# Patient Record
Sex: FEMALE | Race: WHITE | NOT HISPANIC OR LATINO | Employment: OTHER | URBAN - METROPOLITAN AREA
[De-identification: names, ages, dates, MRNs, and addresses within clinical notes are randomized per-mention and may not be internally consistent; named-entity substitution may affect disease eponyms.]

---

## 2023-11-06 ENCOUNTER — APPOINTMENT (OUTPATIENT)
Dept: GENERAL RADIOLOGY | Facility: CLINIC | Age: 88
End: 2023-11-06
Attending: STUDENT IN AN ORGANIZED HEALTH CARE EDUCATION/TRAINING PROGRAM
Payer: MEDICARE

## 2023-11-06 ENCOUNTER — HOSPITAL ENCOUNTER (OUTPATIENT)
Facility: CLINIC | Age: 88
Setting detail: OBSERVATION
Discharge: HOME | End: 2023-11-07
Attending: STUDENT IN AN ORGANIZED HEALTH CARE EDUCATION/TRAINING PROGRAM | Admitting: STUDENT IN AN ORGANIZED HEALTH CARE EDUCATION/TRAINING PROGRAM
Payer: MEDICARE

## 2023-11-06 DIAGNOSIS — R06.2 WHEEZING: ICD-10-CM

## 2023-11-06 DIAGNOSIS — R06.02 SHORTNESS OF BREATH: ICD-10-CM

## 2023-11-06 DIAGNOSIS — J44.1 COPD WITH ACUTE EXACERBATION (H): Primary | ICD-10-CM

## 2023-11-06 PROBLEM — K61.1 PERI-RECTAL ABSCESS: Status: ACTIVE | Noted: 2021-09-22

## 2023-11-06 PROBLEM — J38.5 LARYNGOSPASM: Status: ACTIVE | Noted: 2019-06-12

## 2023-11-06 LAB
ANION GAP SERPL CALCULATED.3IONS-SCNC: 9 MMOL/L (ref 7–15)
ATRIAL RATE - MUSE: 87 BPM
BASOPHILS # BLD AUTO: 0.1 10E3/UL (ref 0–0.2)
BASOPHILS NFR BLD AUTO: 1 %
BUN SERPL-MCNC: 10.9 MG/DL (ref 8–23)
CALCIUM SERPL-MCNC: 7.5 MG/DL (ref 8.8–10.2)
CHLORIDE SERPL-SCNC: 109 MMOL/L (ref 98–107)
CREAT SERPL-MCNC: 0.48 MG/DL (ref 0.51–0.95)
DEPRECATED HCO3 PLAS-SCNC: 23 MMOL/L (ref 22–29)
DIASTOLIC BLOOD PRESSURE - MUSE: NORMAL MMHG
EGFRCR SERPLBLD CKD-EPI 2021: >90 ML/MIN/1.73M2
EOSINOPHIL # BLD AUTO: 0.5 10E3/UL (ref 0–0.7)
EOSINOPHIL NFR BLD AUTO: 5 %
ERYTHROCYTE [DISTWIDTH] IN BLOOD BY AUTOMATED COUNT: 13.6 % (ref 10–15)
FLUAV RNA SPEC QL NAA+PROBE: NEGATIVE
FLUBV RNA RESP QL NAA+PROBE: NEGATIVE
GLUCOSE SERPL-MCNC: 104 MG/DL (ref 70–99)
HCT VFR BLD AUTO: 36.2 % (ref 35–47)
HGB BLD-MCNC: 11.8 G/DL (ref 11.7–15.7)
IMM GRANULOCYTES # BLD: 0 10E3/UL
IMM GRANULOCYTES NFR BLD: 0 %
INTERPRETATION ECG - MUSE: NORMAL
LYMPHOCYTES # BLD AUTO: 1.3 10E3/UL (ref 0.8–5.3)
LYMPHOCYTES NFR BLD AUTO: 14 %
MCH RBC QN AUTO: 31.6 PG (ref 26.5–33)
MCHC RBC AUTO-ENTMCNC: 32.6 G/DL (ref 31.5–36.5)
MCV RBC AUTO: 97 FL (ref 78–100)
MONOCYTES # BLD AUTO: 0.9 10E3/UL (ref 0–1.3)
MONOCYTES NFR BLD AUTO: 10 %
NEUTROPHILS # BLD AUTO: 6.7 10E3/UL (ref 1.6–8.3)
NEUTROPHILS NFR BLD AUTO: 70 %
NRBC # BLD AUTO: 0 10E3/UL
NRBC BLD AUTO-RTO: 0 /100
NT-PROBNP SERPL-MCNC: 120 PG/ML (ref 0–1800)
P AXIS - MUSE: 77 DEGREES
PLATELET # BLD AUTO: 246 10E3/UL (ref 150–450)
POTASSIUM SERPL-SCNC: 3.2 MMOL/L (ref 3.4–5.3)
POTASSIUM SERPL-SCNC: 4.8 MMOL/L (ref 3.4–5.3)
PR INTERVAL - MUSE: 128 MS
QRS DURATION - MUSE: 84 MS
QT - MUSE: 378 MS
QTC - MUSE: 454 MS
R AXIS - MUSE: 85 DEGREES
RBC # BLD AUTO: 3.74 10E6/UL (ref 3.8–5.2)
RSV RNA SPEC NAA+PROBE: NEGATIVE
SARS-COV-2 RNA RESP QL NAA+PROBE: NEGATIVE
SODIUM SERPL-SCNC: 141 MMOL/L (ref 135–145)
SYSTOLIC BLOOD PRESSURE - MUSE: NORMAL MMHG
T AXIS - MUSE: -5 DEGREES
TROPONIN T SERPL HS-MCNC: <6 NG/L
VENTRICULAR RATE- MUSE: 87 BPM
WBC # BLD AUTO: 9.5 10E3/UL (ref 4–11)

## 2023-11-06 PROCEDURE — G0378 HOSPITAL OBSERVATION PER HR: HCPCS

## 2023-11-06 PROCEDURE — 87637 SARSCOV2&INF A&B&RSV AMP PRB: CPT | Performed by: STUDENT IN AN ORGANIZED HEALTH CARE EDUCATION/TRAINING PROGRAM

## 2023-11-06 PROCEDURE — 99207 PR APP CREDIT; MD BILLING SHARED VISIT: CPT | Mod: FS | Performed by: HOSPITALIST

## 2023-11-06 PROCEDURE — 84132 ASSAY OF SERUM POTASSIUM: CPT | Mod: 91 | Performed by: PHYSICIAN ASSISTANT

## 2023-11-06 PROCEDURE — 999N000157 HC STATISTIC RCP TIME EA 10 MIN

## 2023-11-06 PROCEDURE — 99285 EMERGENCY DEPT VISIT HI MDM: CPT | Mod: 25

## 2023-11-06 PROCEDURE — 99222 1ST HOSP IP/OBS MODERATE 55: CPT | Mod: AI | Performed by: PHYSICIAN ASSISTANT

## 2023-11-06 PROCEDURE — 84484 ASSAY OF TROPONIN QUANT: CPT | Performed by: STUDENT IN AN ORGANIZED HEALTH CARE EDUCATION/TRAINING PROGRAM

## 2023-11-06 PROCEDURE — 94640 AIRWAY INHALATION TREATMENT: CPT

## 2023-11-06 PROCEDURE — 85025 COMPLETE CBC W/AUTO DIFF WBC: CPT | Performed by: STUDENT IN AN ORGANIZED HEALTH CARE EDUCATION/TRAINING PROGRAM

## 2023-11-06 PROCEDURE — 36415 COLL VENOUS BLD VENIPUNCTURE: CPT | Performed by: PHYSICIAN ASSISTANT

## 2023-11-06 PROCEDURE — 250N000009 HC RX 250: Performed by: PHYSICIAN ASSISTANT

## 2023-11-06 PROCEDURE — 71046 X-RAY EXAM CHEST 2 VIEWS: CPT

## 2023-11-06 PROCEDURE — 93005 ELECTROCARDIOGRAM TRACING: CPT

## 2023-11-06 PROCEDURE — 96375 TX/PRO/DX INJ NEW DRUG ADDON: CPT

## 2023-11-06 PROCEDURE — 83880 ASSAY OF NATRIURETIC PEPTIDE: CPT | Performed by: STUDENT IN AN ORGANIZED HEALTH CARE EDUCATION/TRAINING PROGRAM

## 2023-11-06 PROCEDURE — 80048 BASIC METABOLIC PNL TOTAL CA: CPT | Performed by: STUDENT IN AN ORGANIZED HEALTH CARE EDUCATION/TRAINING PROGRAM

## 2023-11-06 PROCEDURE — 250N000009 HC RX 250: Performed by: STUDENT IN AN ORGANIZED HEALTH CARE EDUCATION/TRAINING PROGRAM

## 2023-11-06 PROCEDURE — 96374 THER/PROPH/DIAG INJ IV PUSH: CPT

## 2023-11-06 PROCEDURE — 250N000013 HC RX MED GY IP 250 OP 250 PS 637: Performed by: PHYSICIAN ASSISTANT

## 2023-11-06 PROCEDURE — 36415 COLL VENOUS BLD VENIPUNCTURE: CPT | Performed by: STUDENT IN AN ORGANIZED HEALTH CARE EDUCATION/TRAINING PROGRAM

## 2023-11-06 PROCEDURE — 250N000011 HC RX IP 250 OP 636: Performed by: PHYSICIAN ASSISTANT

## 2023-11-06 PROCEDURE — 250N000011 HC RX IP 250 OP 636: Mod: JZ | Performed by: STUDENT IN AN ORGANIZED HEALTH CARE EDUCATION/TRAINING PROGRAM

## 2023-11-06 RX ORDER — PROCHLORPERAZINE 25 MG
12.5 SUPPOSITORY, RECTAL RECTAL EVERY 12 HOURS PRN
Status: DISCONTINUED | OUTPATIENT
Start: 2023-11-06 | End: 2023-11-07 | Stop reason: HOSPADM

## 2023-11-06 RX ORDER — AMOXICILLIN 250 MG
2 CAPSULE ORAL 2 TIMES DAILY
Status: DISCONTINUED | OUTPATIENT
Start: 2023-11-06 | End: 2023-11-07 | Stop reason: HOSPADM

## 2023-11-06 RX ORDER — ACYCLOVIR 400 MG/1
400 TABLET ORAL 3 TIMES DAILY
COMMUNITY
Start: 2023-10-10

## 2023-11-06 RX ORDER — ONDANSETRON 2 MG/ML
4 INJECTION INTRAMUSCULAR; INTRAVENOUS EVERY 6 HOURS PRN
Status: DISCONTINUED | OUTPATIENT
Start: 2023-11-06 | End: 2023-11-07 | Stop reason: HOSPADM

## 2023-11-06 RX ORDER — CHOLESTYRAMINE 4 G/9G
4 POWDER, FOR SUSPENSION ORAL DAILY
Status: DISCONTINUED | OUTPATIENT
Start: 2023-11-06 | End: 2023-11-07 | Stop reason: HOSPADM

## 2023-11-06 RX ORDER — NALOXONE HYDROCHLORIDE 0.4 MG/ML
0.2 INJECTION, SOLUTION INTRAMUSCULAR; INTRAVENOUS; SUBCUTANEOUS
Status: DISCONTINUED | OUTPATIENT
Start: 2023-11-06 | End: 2023-11-07 | Stop reason: HOSPADM

## 2023-11-06 RX ORDER — ACETAMINOPHEN 325 MG/1
650 TABLET ORAL EVERY 6 HOURS PRN
Status: DISCONTINUED | OUTPATIENT
Start: 2023-11-06 | End: 2023-11-07 | Stop reason: HOSPADM

## 2023-11-06 RX ORDER — CHOLESTYRAMINE 4 G/9G
POWDER, FOR SUSPENSION ORAL
COMMUNITY

## 2023-11-06 RX ORDER — PROCHLORPERAZINE MALEATE 5 MG
5 TABLET ORAL EVERY 6 HOURS PRN
Status: DISCONTINUED | OUTPATIENT
Start: 2023-11-06 | End: 2023-11-07 | Stop reason: HOSPADM

## 2023-11-06 RX ORDER — ALBUTEROL SULFATE 0.83 MG/ML
2.5 SOLUTION RESPIRATORY (INHALATION)
Status: DISCONTINUED | OUTPATIENT
Start: 2023-11-06 | End: 2023-11-07 | Stop reason: HOSPADM

## 2023-11-06 RX ORDER — ONDANSETRON 4 MG/1
4 TABLET, ORALLY DISINTEGRATING ORAL EVERY 6 HOURS PRN
Status: DISCONTINUED | OUTPATIENT
Start: 2023-11-06 | End: 2023-11-07 | Stop reason: HOSPADM

## 2023-11-06 RX ORDER — MULTIPLE VITAMINS W/ MINERALS TAB 9MG-400MCG
1 TAB ORAL DAILY
COMMUNITY

## 2023-11-06 RX ORDER — ACYCLOVIR 400 MG/1
400 TABLET ORAL 3 TIMES DAILY PRN
Status: DISCONTINUED | OUTPATIENT
Start: 2023-11-06 | End: 2023-11-07 | Stop reason: HOSPADM

## 2023-11-06 RX ORDER — IPRATROPIUM BROMIDE AND ALBUTEROL SULFATE 2.5; .5 MG/3ML; MG/3ML
3 SOLUTION RESPIRATORY (INHALATION) ONCE
Status: COMPLETED | OUTPATIENT
Start: 2023-11-06 | End: 2023-11-06

## 2023-11-06 RX ORDER — AZITHROMYCIN 500 MG/1
500 INJECTION, POWDER, LYOPHILIZED, FOR SOLUTION INTRAVENOUS ONCE
Status: COMPLETED | OUTPATIENT
Start: 2023-11-06 | End: 2023-11-06

## 2023-11-06 RX ORDER — NALOXONE HYDROCHLORIDE 0.4 MG/ML
0.4 INJECTION, SOLUTION INTRAMUSCULAR; INTRAVENOUS; SUBCUTANEOUS
Status: DISCONTINUED | OUTPATIENT
Start: 2023-11-06 | End: 2023-11-07 | Stop reason: HOSPADM

## 2023-11-06 RX ORDER — IPRATROPIUM BROMIDE AND ALBUTEROL SULFATE 2.5; .5 MG/3ML; MG/3ML
3 SOLUTION RESPIRATORY (INHALATION)
Status: COMPLETED | OUTPATIENT
Start: 2023-11-06 | End: 2023-11-06

## 2023-11-06 RX ORDER — METHYLPREDNISOLONE SODIUM SUCCINATE 40 MG/ML
40 INJECTION, POWDER, LYOPHILIZED, FOR SOLUTION INTRAMUSCULAR; INTRAVENOUS DAILY
Status: DISCONTINUED | OUTPATIENT
Start: 2023-11-07 | End: 2023-11-07 | Stop reason: HOSPADM

## 2023-11-06 RX ORDER — ACETAMINOPHEN 650 MG/1
650 SUPPOSITORY RECTAL EVERY 6 HOURS PRN
Status: DISCONTINUED | OUTPATIENT
Start: 2023-11-06 | End: 2023-11-07 | Stop reason: HOSPADM

## 2023-11-06 RX ORDER — AZITHROMYCIN 250 MG/1
TABLET, FILM COATED ORAL
Qty: 6 TABLET | Refills: 0 | Status: SHIPPED | OUTPATIENT
Start: 2023-11-06 | End: 2023-11-07

## 2023-11-06 RX ORDER — LIDOCAINE 40 MG/G
CREAM TOPICAL
Status: DISCONTINUED | OUTPATIENT
Start: 2023-11-06 | End: 2023-11-07 | Stop reason: HOSPADM

## 2023-11-06 RX ORDER — CLONAZEPAM 0.5 MG/1
0.25 TABLET ORAL 2 TIMES DAILY PRN
Status: DISCONTINUED | OUTPATIENT
Start: 2023-11-06 | End: 2023-11-07 | Stop reason: HOSPADM

## 2023-11-06 RX ORDER — AZITHROMYCIN 250 MG/1
250 TABLET, FILM COATED ORAL DAILY
Status: DISCONTINUED | OUTPATIENT
Start: 2023-11-07 | End: 2023-11-07 | Stop reason: HOSPADM

## 2023-11-06 RX ORDER — METHYLPREDNISOLONE SODIUM SUCCINATE 125 MG/2ML
60 INJECTION, POWDER, LYOPHILIZED, FOR SOLUTION INTRAMUSCULAR; INTRAVENOUS ONCE
Status: COMPLETED | OUTPATIENT
Start: 2023-11-06 | End: 2023-11-06

## 2023-11-06 RX ORDER — IPRATROPIUM BROMIDE AND ALBUTEROL SULFATE 2.5; .5 MG/3ML; MG/3ML
3 SOLUTION RESPIRATORY (INHALATION)
Status: DISCONTINUED | OUTPATIENT
Start: 2023-11-06 | End: 2023-11-07 | Stop reason: HOSPADM

## 2023-11-06 RX ORDER — CLONAZEPAM 0.5 MG/1
0.25 TABLET ORAL 2 TIMES DAILY PRN
COMMUNITY
Start: 2023-10-10

## 2023-11-06 RX ORDER — PAROXETINE 10 MG/1
1 TABLET, FILM COATED ORAL EVERY MORNING
COMMUNITY
Start: 2023-10-10

## 2023-11-06 RX ORDER — POTASSIUM CHLORIDE 1.5 G/1.58G
40 POWDER, FOR SOLUTION ORAL ONCE
Status: COMPLETED | OUTPATIENT
Start: 2023-11-06 | End: 2023-11-06

## 2023-11-06 RX ORDER — ACETYLCYSTEINE 200 MG/ML
4 SOLUTION ORAL; RESPIRATORY (INHALATION)
Status: DISCONTINUED | OUTPATIENT
Start: 2023-11-06 | End: 2023-11-07

## 2023-11-06 RX ORDER — PREDNISONE 20 MG/1
TABLET ORAL
Qty: 10 TABLET | Refills: 0 | Status: SHIPPED | OUTPATIENT
Start: 2023-11-06 | End: 2023-11-07

## 2023-11-06 RX ORDER — ALBUTEROL SULFATE 90 UG/1
1-2 AEROSOL, METERED RESPIRATORY (INHALATION) EVERY 4 HOURS PRN
COMMUNITY
Start: 2023-10-10

## 2023-11-06 RX ORDER — AMOXICILLIN 250 MG
1 CAPSULE ORAL 2 TIMES DAILY
Status: DISCONTINUED | OUTPATIENT
Start: 2023-11-06 | End: 2023-11-07 | Stop reason: HOSPADM

## 2023-11-06 RX ORDER — BISACODYL 10 MG
10 SUPPOSITORY, RECTAL RECTAL DAILY PRN
Status: DISCONTINUED | OUTPATIENT
Start: 2023-11-06 | End: 2023-11-07 | Stop reason: HOSPADM

## 2023-11-06 RX ORDER — HYDRALAZINE HYDROCHLORIDE 20 MG/ML
10 INJECTION INTRAMUSCULAR; INTRAVENOUS EVERY 4 HOURS PRN
Status: DISCONTINUED | OUTPATIENT
Start: 2023-11-06 | End: 2023-11-07 | Stop reason: HOSPADM

## 2023-11-06 RX ORDER — HYDROMORPHONE HCL IN WATER/PF 6 MG/30 ML
0.2 PATIENT CONTROLLED ANALGESIA SYRINGE INTRAVENOUS
Status: DISCONTINUED | OUTPATIENT
Start: 2023-11-06 | End: 2023-11-07 | Stop reason: HOSPADM

## 2023-11-06 RX ORDER — LABETALOL HYDROCHLORIDE 5 MG/ML
10 INJECTION, SOLUTION INTRAVENOUS
Status: DISCONTINUED | OUTPATIENT
Start: 2023-11-06 | End: 2023-11-07 | Stop reason: HOSPADM

## 2023-11-06 RX ADMIN — POTASSIUM CHLORIDE 40 MEQ: 1.5 POWDER, FOR SOLUTION ORAL at 11:07

## 2023-11-06 RX ADMIN — CHOLESTYRAMINE 4 G: 4 POWDER, FOR SUSPENSION ORAL at 18:05

## 2023-11-06 RX ADMIN — IPRATROPIUM BROMIDE AND ALBUTEROL SULFATE 3 ML: .5; 3 SOLUTION RESPIRATORY (INHALATION) at 07:08

## 2023-11-06 RX ADMIN — AZITHROMYCIN MONOHYDRATE 500 MG: 500 INJECTION, POWDER, LYOPHILIZED, FOR SOLUTION INTRAVENOUS at 14:54

## 2023-11-06 RX ADMIN — ACETYLCYSTEINE 4 ML: 200 INHALANT RESPIRATORY (INHALATION) at 19:08

## 2023-11-06 RX ADMIN — IPRATROPIUM BROMIDE AND ALBUTEROL SULFATE 3 ML: .5; 3 SOLUTION RESPIRATORY (INHALATION) at 08:48

## 2023-11-06 RX ADMIN — IPRATROPIUM BROMIDE AND ALBUTEROL SULFATE 3 ML: .5; 3 SOLUTION RESPIRATORY (INHALATION) at 06:32

## 2023-11-06 RX ADMIN — IPRATROPIUM BROMIDE AND ALBUTEROL SULFATE 3 ML: .5; 3 SOLUTION RESPIRATORY (INHALATION) at 07:09

## 2023-11-06 RX ADMIN — METHYLPREDNISOLONE SODIUM SUCCINATE 62.5 MG: 125 INJECTION, POWDER, FOR SOLUTION INTRAMUSCULAR; INTRAVENOUS at 06:32

## 2023-11-06 RX ADMIN — IPRATROPIUM BROMIDE AND ALBUTEROL SULFATE 3 ML: .5; 3 SOLUTION RESPIRATORY (INHALATION) at 19:08

## 2023-11-06 ASSESSMENT — ACTIVITIES OF DAILY LIVING (ADL)
ADLS_ACUITY_SCORE: 35

## 2023-11-06 NOTE — ED NOTES
[facility]  ED Nurse Handoff Report    ED Chief complaint: Shortness of Breath      ED Diagnosis:   Final diagnoses:   Shortness of breath   Wheezing   COPD with acute exacerbation (H)       Code Status: Full Code    Allergies:   Allergies   Allergen Reactions    Morphine And Related     Sulfa Antibiotics        Patient Story: 88 year old female who has a history of COPD and is presenting with shortness of breath. The patient started to have shortness of breath and wheezing a couple weeks ago which she thought was due to a COPD flare up. She was seen at [facility] on 10/18. She was put on albuterol and was prescribed prednisone for the past 4 days. She started to have shortness of breath with some wheezing a couple days ago again. Her shortness of breath is worse with exertion. She is not on oxygen at home. Her roadtest did not go well pt became more tachycardic and tachypneic and was sating in the high 80s  Focused Assessment:    Results for orders placed or performed during the hospital encounter of 11/06/23   XR Chest 2 Views     Status: None    Narrative    XR CHEST 2 VIEWS 11/6/2023 7:01 AM    HISTORY: Shortness of breath    COMPARISON: None available      Impression    IMPRESSION: Central vascular congestion. Diffuse interstitial  coarsening suspected to reflect a combination of mild edema and  chronic changes. Trace left-sided pleural fluid. No focal pneumonic  consolidation. Upper normal heart size. Age indeterminate but likely  old upper and lower thoracic vertebral body compression fractures.    MED CANTU MD         SYSTEM ID:  [id number]   Basic metabolic panel     Status: Abnormal   Result Value Ref Range    Sodium 141 135 - 145 mmol/L    Potassium 3.2 (L) 3.4 - 5.3 mmol/L    Chloride 109 (H) 98 - 107 mmol/L    Carbon Dioxide (CO2) 23 22 - 29 mmol/L    Anion Gap 9 7 - 15 mmol/L    Urea Nitrogen 10.9 8.0 - 23.0 mg/dL    Creatinine 0.48 (L) 0.51 - 0.95 mg/dL    GFR Estimate >90  >60 mL/min/1.73m2    Calcium 7.5 (L) 8.8 - 10.2 mg/dL    Glucose 104 (H) 70 - 99 mg/dL   Troponin T, High Sensitivity     Status: Normal   Result Value Ref Range    Troponin T, High Sensitivity <6 <=14 ng/L   Symptomatic Influenza A/B, RSV, & SARS-CoV2 PCR (COVID-19) Nose     Status: Normal    Specimen: Nose; Swab   Result Value Ref Range    Influenza A PCR Negative Negative    Influenza B PCR Negative Negative    RSV PCR Negative Negative    SARS CoV2 PCR Negative Negative    Narrative    Testing was performed using the Xpert Xpress CoV2/Flu/RSV Assay on the [employer] Instrument. This test should be ordered for the detection of SARS-CoV-2, influenza, and RSV viruses in individuals who meet clinical and/or epidemiological criteria. Test performance is unknown in asymptomatic patients. This test is for in vitro diagnostic use under the FDA EUA for laboratories certified under CLIA to perform high or moderate complexity testing. This test has not been FDA cleared or approved. A negative result does not rule out the presence of PCR inhibitors in the specimen or target RNA in concentration below the limit of detection for the assay. If only one viral target is positive but coinfection with multiple targets is suspected, the sample should be re-tested with another FDA cleared, approved, or authorized test, if coinfection would change clinical management. This test was validated by the [facility]. These laboratories are certified under the Clinical Laboratory Improvement Amendments of 1988 (CLIA-88) as qualified to perform high complexity laboratory testing.   CBC with platelets and differential     Status: Abnormal   Result Value Ref Range    WBC Count 9.5 4.0 - 11.0 10e3/uL    RBC Count 3.74 (L) 3.80 - 5.20 10e6/uL    Hemoglobin 11.8 11.7 - 15.7 g/dL    Hematocrit 36.2 35.0 - 47.0 %    MCV 97 78 - 100 fL    MCH 31.6 26.5 - 33.0 pg    MCHC 32.6 31.5 - 36.5 g/dL    RDW 13.6 10.0 - 15.0 %     Platelet Count 246 150 - 450 10e3/uL    % Neutrophils 70 %    % Lymphocytes 14 %    % Monocytes 10 %    % Eosinophils 5 %    % Basophils 1 %    % Immature Granulocytes 0 %    NRBCs per 100 WBC 0 <1 /100    Absolute Neutrophils 6.7 1.6 - 8.3 10e3/uL    Absolute Lymphocytes 1.3 0.8 - 5.3 10e3/uL    Absolute Monocytes 0.9 0.0 - 1.3 10e3/uL    Absolute Eosinophils 0.5 0.0 - 0.7 10e3/uL    Absolute Basophils 0.1 0.0 - 0.2 10e3/uL    Absolute Immature Granulocytes 0.0 <=0.4 10e3/uL    Absolute NRBCs 0.0 10e3/uL   EKG 12-lead, tracing only     Status: None   Result Value Ref Range    Systolic Blood Pressure  mmHg    Diastolic Blood Pressure  mmHg    Ventricular Rate 87 BPM    Atrial Rate 87 BPM    LA Interval 128 ms    QRS Duration 84 ms     ms    QTc 454 ms    P Axis 77 degrees    R AXIS 85 degrees    T Axis -5 degrees    Interpretation ECG       Sinus rhythm  Septal infarct , age undetermined  Abnormal ECG  No previous ECGs available  Confirmed by GENERATED REPORT, COMPUTER ([id number]),  Ted Hampton ([id number]) on 11/6/2023 6:44:36 AM     CBC with platelets differential     Status: Abnormal    Narrative    The following orders were created for panel order CBC with platelets differential.  Procedure                               Abnormality         Status                     ---------                               -----------         ------                     CBC with platelets and d...[[id number]]  Abnormal            Final result                 Please view results for these tests on the individual orders.         Treatments and/or interventions provided: nebs  Patient's response to treatments and/or interventions:     To be done/followed up on inpatient unit:  monitor    Does this patient have any cognitive concerns?: no    Activity level - Baseline/Home:  Independent  Activity Level - Current:   Independent    Patient's Preferred language: English   Needed?: No    Isolation: None  Infection: Not  Applicable  Patient tested for COVID 19 prior to admission: NO  Bariatric?: No    Vital Signs:   Vitals:    11/06/23 0536 11/06/23 0539 11/06/23 0600 11/06/23 0639   BP:  (!) 170/96 (!) 156/100 (!) 150/92   Pulse:  91  89   Resp:  22  29   Temp:  97.8  F (36.6  C)     TempSrc:  Oral     SpO2: 92% 92% 91% 100%       Cardiac Rhythm:     Was the PSS-3 completed:   Yes  What interventions are required if any?               Family Comments:   OBS brochure/video discussed/provided to patient/family: No              Name of person given brochure if not patient:               Relationship to patient:     For the majority of the shift this patient's behavior was Green.   Behavioral interventions performed were .    ED NURSE PHONE NUMBER: [phone]

## 2023-11-06 NOTE — PROGRESS NOTES
Observation goals  PRIOR TO DISCHARGE        Comments:   -diagnostic tests and consults completed and resulted  Not met  -vital signs normal or at patient baseline  Met  -tolerating oral intake to maintain hydration  Met  -dyspnea improved and O2 sats greater than 88% on room air or prior home oxygen levels  Met  -safe disposition plan has been identified  Not met  Nurse to notify provider when observation goals have been met and patient is ready for discharge.

## 2023-11-06 NOTE — ED TRIAGE NOTES
Pt comes in from home for SOB. Hx of COPD and was seen for an exacerbation a few weeks ago in [place], [place]. She is currently 91% on room air. Denies CP. Used albuterol inhaler at home with no relief. EMS gave 1 duo neb which seemed to help.

## 2023-11-06 NOTE — ED PROVIDER NOTES
History   Chief Complaint:  Shortness of Breath       HPI:  Anitha Gauthier is a pleasant 88 year old female who has a history of COPD and is presenting with shortness of breath. The patient started to have shortness of breath and wheezing a couple weeks ago which she thought was due to a COPD flare up. She was seen at [facility] in [place] on 10/18. She was put on albuterol and was prescribed prednisone for the past 4 days. She started to have shortness of breath with some wheezing a couple days ago again. Her shortness of breath is worse with exertion. She has had right sided chest pain for the past couple weeks. She denies fevers or chills. She had a cough today after using albuterol this morning. She denies nausea, vomiting, or diaphoresis. She is not on oxygen at home.  Patient also complains of some new bilateral leg swelling over the last 2 weeks which does not resolve with having her legs elevated overnight.    Independent Historian:  None. Only the patient provided history.    Review of External Notes:  I personally reviewed notes from the patient's  emergency department visit at [facility]  dated 10/18/2023. This provided me with information regarding  patient's previous recent presentation for dyspnea .     I personally reviewed the patient's chart, including their medication list and available past medical history, past surgical history, family history, and social history.    Physical Exam   Patient Vitals for the past 24 hrs:   BP Temp Temp src Pulse Resp SpO2   11/06/23 0639 (!) 150/92 -- -- 89 29 100 %   11/06/23 0600 (!) 156/100 -- -- -- -- 91 %   11/06/23 0539 (!) 170/96 97.8  F (36.6  C) Oral 91 22 92 %   11/06/23 0536 -- -- -- -- -- 92 %      Physical Exam  Vitals reviewed.   Constitutional:       Appearance: She is well-developed.   Cardiovascular:      Rate and Rhythm: Regular rhythm. Tachycardia present.      Pulses: Normal pulses.   Pulmonary:      Effort: Tachypnea present.       Breath sounds: Examination of the right-upper field reveals wheezing. Examination of the left-upper field reveals wheezing. Examination of the right-middle field reveals wheezing. Examination of the left-middle field reveals wheezing. Examination of the right-lower field reveals wheezing. Examination of the left-lower field reveals wheezing. Wheezing present. No rhonchi or rales.      Comments: Significantly prolonged expiratory phase with wheeze  Musculoskeletal:      Right lower leg: No edema.      Left lower leg: No edema.   Skin:     General: Skin is warm and dry.   Neurological:      Mental Status: She is alert.            Emergency Department Course   ECG:  ECG results from 11/06/23   EKG 12-lead, tracing only     Value    Systolic Blood Pressure     Diastolic Blood Pressure     Ventricular Rate 87    Atrial Rate 87    ID Interval 128    QRS Duration 84        QTc 454    P Axis 77    R AXIS 85    T Axis -5    Interpretation ECG      Sinus rhythm  Septal infarct , age undetermined  Abnormal ECG  No previous ECGs available  Confirmed by GENERATED REPORT, COMPUTER ([id number]),  Ted Hampton ([id number]) on 11/6/2023 6:44:36 AM         Imaging: Laboratory:   XR Chest 2 Views   Final Result   IMPRESSION: Central vascular congestion. Diffuse interstitial   coarsening suspected to reflect a combination of mild edema and   chronic changes. Trace left-sided pleural fluid. No focal pneumonic   consolidation. Upper normal heart size. Age indeterminate but likely   old upper and lower thoracic vertebral body compression fractures.      MED CANTU MD            SYSTEM ID:  [id number]           and Report per radiology Labs Ordered and Resulted from Time of ED Arrival to Time of ED Departure   BASIC METABOLIC PANEL - Abnormal       Result Value    Sodium 141      Potassium 3.2 (*)     Chloride 109 (*)     Carbon Dioxide (CO2) 23      Anion Gap 9      Urea Nitrogen 10.9      Creatinine 0.48 (*)     GFR  Estimate >90      Calcium 7.5 (*)     Glucose 104 (*)    CBC WITH PLATELETS AND DIFFERENTIAL - Abnormal    WBC Count 9.5      RBC Count 3.74 (*)     Hemoglobin 11.8      Hematocrit 36.2      MCV 97      MCH 31.6      MCHC 32.6      RDW 13.6      Platelet Count 246      % Neutrophils 70      % Lymphocytes 14      % Monocytes 10      % Eosinophils 5      % Basophils 1      % Immature Granulocytes 0      NRBCs per 100 WBC 0      Absolute Neutrophils 6.7      Absolute Lymphocytes 1.3      Absolute Monocytes 0.9      Absolute Eosinophils 0.5      Absolute Basophils 0.1      Absolute Immature Granulocytes 0.0      Absolute NRBCs 0.0     TROPONIN T, HIGH SENSITIVITY - Normal    Troponin T, High Sensitivity <6     INFLUENZA A/B, RSV, & SARS-COV2 PCR - Normal    Influenza A PCR Negative      Influenza B PCR Negative      RSV PCR Negative      SARS CoV2 PCR Negative           Interventions & Assessments:    Interventions:  Medications   ipratropium - albuterol 0.5 mg/2.5 mg/3 mL (DUONEB) neb solution 3 mL (3 mLs Nebulization $Given 11/6/23 0709)   methylPREDNISolone sodium succinate (solu-MEDROL) injection 62.5 mg (62.5 mg Intravenous $Given 11/6/23 0632)   ipratropium - albuterol 0.5 mg/2.5 mg/3 mL (DUONEB) neb solution 3 mL (3 mLs Nebulization $Given 11/6/23 0848)     Assessments:  6:12 AM  I obtained history and performed initial assessment of the patient.   07:17 AM I rechecked the patient.  8:11 AM  I rechecked the patient and explained findings.  8:39 AM I rechecked the patient.  9:45 AM I rechecked the patient. Patient did not pass road test.     Independent Interpretation (X-rays, CTs, rhythm strip):  I independently interpreted the patient's chest x-ray; reassuring against infiltrate    Consultations/Discussion of Management or Tests:  9:54 AM I spoke with Rachelle Miller PA-C, for Dr. Ojeda of the Hospitalist service to discuss the patient's findings, presentation, and plan of care.    Social Determinants of  Health affecting care:   None.     Disposition:  The patient was admitted to the hospital under the care of Dr. Ojeda.  Impression & Plan   Medical Decision Making:  ED Course as of 11/06/23 1126   Mon Nov 06, 2023   0711 Patient with history of COPD with recent exacerbation presenting with shortness of breath.  Vital signs notable for mild hypoxemia on arrival, high 80s percent on room air.  Auscultation shows expiratory wheeze with significantly prolonged expiratory phase.  Considered pneumonia, COVID or influenza, acute coronary syndrome, COPD exacerbation, among others.  Patient's troponin was within normal limits.  EKG showed normal sinus rhythm, normal axis.  T wave inversions in anterior leads but no ST segment elevation or depression.  COVID and influenza testing was negative.  Patient has no leukocytosis.  Chest x-ray revealed no infiltrate concerning for pneumonia.  Consistent with COPD exacerbation.  Treated the patient with DuoNebs and methylprednisolone.    0844 I reassessed the patient.  Wheezing has improved but still a small amount of residual wheeze bilaterally.  Will attempt additional DuoNeb and then test of ambulation.  Patient states symptoms are worse when she walks.   1006 After test of ambulation, patient became more hypoxemic and tachycardic and significantly short of breath.  We will plan for observation in the hospital for further evaluation and management of COPD exacerbation.       Diagnosis:    ICD-10-CM    1. COPD with acute exacerbation (H)  J44.1       2. Shortness of breath  R06.02       3. Wheezing  R06.2          Scribe Disclosure:  Lolly SERRATO, am serving as a scribe at 6:02 AM on 11/6/2023 to document services personally performed by Vu Mcdaniels MD based on my observations and the provider's statements to me.         Vu Mcdaniels MD  11/06/23 1126       Vu Mcdaniels MD  11/06/23 1126

## 2023-11-06 NOTE — ED NOTES
Bed: ED03  Expected date:   Expected time:   Means of arrival:   Comments:  HEMS [id number] sob copd eta 0578

## 2023-11-06 NOTE — PHARMACY-ADMISSION MEDICATION HISTORY
Pharmacist Admission Medication History    Admission medication history is complete. The information provided in this note is only as accurate as the sources available at the time of the update.    Information Source(s): Patient and CareEverywhere/SureScripts via in-person    Pertinent Information:     Changes made to PTA medication list:  Added: cholestyramine, albuterol, clonazepam, paroxetine, Spiriva, multivitmain  Deleted: aspirin  Changed: None    Medication Affordability:  Not including over the counter (OTC) medications, was there a time in the past 3 months when you did not take your medications as prescribed because of cost?: No    Allergies reviewed with patient and updates made in EHR: yes    Medication History Completed By: MARY JOSE [facility] 11/6/2023 10:31 AM    Prior to Admission medications    Medication Sig Last Dose Taking? Auth Provider Long Term End Date   acyclovir (ZOVIRAX) 400 MG tablet Take 400 mg by mouth 3 times daily During an outbreak More than a month Yes Unknown, Entered By History Yes    albuterol (PROAIR HFA/PROVENTIL HFA/VENTOLIN HFA) 108 (90 Base) MCG/ACT inhaler Inhale 1-2 puffs into the lungs every 4 hours as needed for cough Past Month Yes Unknown, Entered By History Yes             CALCIUM /VITAMIN D TABS 600-125 MG-IU OR 1 TABLET DAILY 11/5/2023 Yes Reported, Patient     cholestyramine (QUESTRAN) 4 GM/DOSE powder Take 1/2 teaspoon daily 11/5/2023 Yes Unknown, Entered By History Yes    clonazePAM (KLONOPIN) 0.5 MG tablet Take 0.25 mg by mouth 2 times daily as needed for anxiety 11/5/2023 Yes Unknown, Entered By History Yes    FOSAMAX 70 MG OR TABS ONE TABLET WEEKLY 11/3/2023 Yes Brendan Nicole MD     multivitamin w/minerals (THERA-VIT-M) tablet Take 1 tablet by mouth daily 11/5/2023 Yes Unknown, Entered By History     PARoxetine (PAXIL) 10 MG tablet Take 1 tablet by mouth every morning 11/5/2023 Yes Unknown, Entered By History              tiotropium (SPIRIVA RESPIMAT) 2.5  MCG/ACT inhaler Inhale 2 puffs into the lungs daily 11/5/2023 Yes Unknown, Entered By History Yes

## 2023-11-06 NOTE — PROGRESS NOTES
Top portion must ALWAYS be completed  PRIMARY Concern: COPD exacerbation   SAFETY RISK Concerns (fall risk, behaviors, etc.): No, Green   Isolation/Type: none  Tests/Procedures for NEXT shift: AM labs, Home O2 test  Consults? (Pending/following, signed-off?) PT, OT, & SW consult  Where is patient from? (Home, TCU, etc.): Home  Other Important info for NEXT shift: Pt Ruby  Anticipated DC date & active delays: TBD  _______________________________________________________________________  SUMMARY NOTE:  Orientation/Cognitive: A&O X4  Observation Goals (Met/ Not Met): Not met  Mobility Level/Assist Equipment: IND walker  Antibiotics & Plan (IV/po, length of tx left): IV azithromycin   Pain Management: denies pain  Tele/VS/O2: VSS, O2 RA  ABNL Lab/BG: Replaced K+ improved from 3.2 to 4.8 next recheck is at 0600  Diet: REG  Bowel/Bladder: continent of B&B  Skin Concerns: none  Drains/Devices: PIV SL

## 2023-11-06 NOTE — PROGRESS NOTES
RECEIVING UNIT ED HANDOFF REVIEW    ED Nurse Handoff Report was reviewed by: Grady Peña RN on November 6, 2023 at 2:00 PM

## 2023-11-06 NOTE — H&P
[facility]    History and Physical - Hospitalist Service       Date of Admission:  11/6/2023    Assessment & Plan   Anitha Gauthier is a 88 year old female with PMH significant for COPD, depression, FOUZIA, microscopic colitis, PMR who was registered to observation on 11/6/2023 with mild COPD exacerbation.    Acute COPD exacerbation  Presented with a few weeks of SOB, seen 10/18 when traveling in [place] for similar, felt improved after 5 day course of steroids but now symptoms worsened again. No fevers, chills, or recent illnesses. No CP. No peripheral edema. No lightheadedness, dizziness. Right sided pleuritic pain with prior exacerbation, none recently. Lives alone, independent, drives, no ambulatory devices. Follows at [facility], previously on Advair, switched to Spiriva due to osteoporosis. Last COPD flare 2 years ago. Hypertensive, 92% on RA in the ED. No leukocytosis. Prolonged expiratory wheezing on exam prior to nebs. Labs significant for K 3.2, stable Cr, proBNP/Troponin WNL, No leukocytosis. Neg resp panel - covid/flu/rsv. EKG TWI lead 3, V3-5. Desaturates with ambulation. Sinus tach post nebs. CXR central vascular congestion, mild edema/chronic changes of COPD, trace Left sided pleural effusion.   -Observation, failed outpatient management  -Methylprednisolone IV 60mg x1, continue 40mg for now, suspect will need steroid taper at discharge given rebound  -Scheduled/PRN nebs  -RCAT  -Flutter valve/IS  -Azithromycin x5 days  -PT/OT/SW/CC   -Basic labs in AM  -Follow up with PCP post discharge, already scheduled for next week at [facility]    High blood pressure without diagnosis of hypertension  Hx white coat syndrome. Monitor.  -PRN labetalol/hydralazine available, avoid use unless prolonged elevation  -Use small BP cuff    Mild hypokalemia: Check and replete per protocol    Chronic stable diagnoses and other pertinent medical history: Appropriate PTA medications will be resumed. Nonessential  medications will be held if patient is observation status.   Microscopic colitis  Sensorineural hearing loss  PMR  Osteoporosis  Dysthymic disorder  Generalized anxiety disorder  Skin cancer  Midline cystocele  Fibrocystic breast disease         Diet:  Regular  DVT Prophylaxis: Low Risk/Ambulatory with no VTE prophylaxis indicated and Ambulate every shift  Wolf Catheter: Not present  Lines: None     Cardiac Monitoring: None  Code Status:  DNR/DNI- confirmed with patient on admission    Clinically Significant Risk Factors Present on Admission        # Hypokalemia: Lowest K = 3.2 mmol/L in last 2 days, will replace as needed                    # COPD: noted on problem list        Disposition Plan      Expected Discharge Date: 11/07/2023                The patient's care was discussed with the Attending Physician, Dr. Ojeda and Patient, and ED Physician.    Rachelle Miller PA-C  Hospitalist Service  [facility]  Securely message with Nomi (more info)  Text page via [facility] Paging/Directory     ______________________________________________________________________    Chief Complaint   SOB    History is obtained from the patient, electronic health record, and emergency department physician    History of Present Illness   Anitha Gauthier is a 88 year old female with PMH significant for COPD, depression, FOUZIA, microscopic colitis, PMR who was registered to observation on 11/6/2023 with mild COPD exacerbation. Presented with a few weeks of SOB, seen 10/18 when traveling in [place] for similar, felt improved after 5 day course of steroids but now symptoms worsened again. No fevers, chills, cough, or recent illnesses. No CP. No peripheral edema. No lightheadedness, dizziness. Right sided pleuritic pain with prior exacerbation, none recently. Lives alone, independent, drives, no ambulatory devices. Follows at [facility], previously on Advair, switched to Spiriva due to osteoporosis. Last COPD flare 2  "years ago.     In the ED, afebrile, hypertensive, 92% on RA. No leukocytosis. Prolonged expiratory wheezing on exam prior to nebs. Labs significant for K 3.2, stable Cr, proBNP/Troponin WNL, No leukocytosis. Neg resp panel - covid/flu/rsv. EKG TWI lead 3, V3-5. Desaturates with ambulation. Sinus tach post nebs. CXR central vascular congestion, mild edema/chronic changes of COPD, trace Left sided pleural effusion.       Past Medical History    Past Medical History:   Diagnosis Date    Depressive disorder, not elsewhere classified     anxiety/depression    Diffuse cystic mastopathy     fibrocystic breast disease    Elevated blood pressure reading without diagnosis of hypertension     \"White Coat HTN\"    Osteoporosis, unspecified     Other and unspecified malignant neoplasm of skin of lip 2000    Squamous cell carcinoma       Past Surgical History   Past Surgical History:   Procedure Laterality Date    COLONOSCOPY  06/10/09    HC REMOVAL GALLBLADDER      Cholecystectomy    ZZHC COLONOSCOPY W/WO BRUSH/WASH  2005        Prior to Admission Medications   Prior to Admission Medications   Prescriptions Last Dose Informant Patient Reported? Taking?   CALCIUM /VITAMIN D TABS 600-125 MG-IU OR 2023  Yes Yes   Si TABLET DAILY   FOSAMAX 70 MG OR TABS 11/3/2023  No Yes   Sig: ONE TABLET WEEKLY   PARoxetine (PAXIL) 10 MG tablet 2023  Yes Yes   Sig: Take 1 tablet by mouth every morning   acyclovir (ZOVIRAX) 400 MG tablet More than a month  Yes Yes   Sig: Take 400 mg by mouth 3 times daily During an outbreak   albuterol (PROAIR HFA/PROVENTIL HFA/VENTOLIN HFA) 108 (90 Base) MCG/ACT inhaler Past Month  Yes Yes   Sig: Inhale 1-2 puffs into the lungs every 4 hours as needed for cough   cholestyramine (QUESTRAN) 4 GM/DOSE powder 2023  Yes Yes   Sig: Take 1/2 teaspoon daily   clonazePAM (KLONOPIN) 0.5 MG tablet 2023  Yes Yes   Sig: Take 0.25 mg by mouth 2 times daily as needed for anxiety   multivitamin " w/minerals (THERA-VIT-M) tablet 11/5/2023  Yes Yes   Sig: Take 1 tablet by mouth daily   tiotropium (SPIRIVA RESPIMAT) 2.5 MCG/ACT inhaler 11/5/2023  Yes Yes   Sig: Inhale 2 puffs into the lungs daily      Facility-Administered Medications: None        Review of Systems    The 10 point Review of Systems is negative other than noted in the HPI or here.     Social History   I have reviewed this patient's social history and updated it with pertinent information if needed.  Social History     Tobacco Use    Smoking status: Former    Smokeless tobacco: Never    Tobacco comments:     Quite 1978   Substance Use Topics    Alcohol use: Yes     Alcohol/week: 3.0 standard drinks of alcohol     Types: 3 Glasses of wine per week     Comment: 1 glass wine 3 days per week    Drug use: No         Allergies   Allergies   Allergen Reactions    Morphine And Related Nausea and Vomiting    Sulfa Antibiotics Hives        Physical Exam   Vital Signs: Temp: 97.8  F (36.6  C) Temp src: Oral BP: (!) 136/90 Pulse: 112   Resp: 21 SpO2: 94 % O2 Device: Other (Comments) (nebulizer) Oxygen Delivery: 8 LPM  Weight: 0 lbs 0 oz    Physical Exam    General: Awake, alert, very pleasant thin elderly woman who appears stated age. Looks comfortable sitting up in bed. No acute distress.  HEENT: Normocephalic, atraumatic. Extraocular movements intact.   Respiratory: Course throughout, mild. No wheezing, rhonchi, or rales. Speaks in full sentences, no hypoxia, no supplemental oxygen, no accessory muscle use.   Cardiovascular: Tachycardia, regular rhythm, +S1 and S2, no murmur auscultated. No peripheral edema.   Gastrointestinal: Soft, non-tender, non-distended. Bowel sounds present.  Skin: Warm, dry. No obvious rashes or lesions on exposed skin. Dorsalis pedis pulses palpable bilaterally.  Musculoskeletal: No joint swelling, erythema or tenderness. Moves all extremities equally.  Neurologic: AAO x3.   Psychiatric: Appropriate mood and affect. No obvious  anxiety or depression.      Medical Decision Making       60 MINUTES SPENT BY ME on the date of service doing chart review, history, exam, documentation & further activities per the note.      Data     I have personally reviewed the following data over the past 24 hrs:    9.5  \   11.8   / 246     141 109 (H) 10.9 /  104 (H)   3.2 (L) 23 0.48 (L) \     Trop: <6 BNP: 120       Imaging results reviewed over the past 24 hrs:   Recent Results (from the past 24 hour(s))   XR Chest 2 Views    Narrative    XR CHEST 2 VIEWS 11/6/2023 7:01 AM    HISTORY: Shortness of breath    COMPARISON: None available      Impression    IMPRESSION: Central vascular congestion. Diffuse interstitial  coarsening suspected to reflect a combination of mild edema and  chronic changes. Trace left-sided pleural fluid. No focal pneumonic  consolidation. Upper normal heart size. Age indeterminate but likely  old upper and lower thoracic vertebral body compression fractures.    MED ACNTU MD         SYSTEM ID:  [id number]

## 2023-11-07 VITALS
TEMPERATURE: 98.4 F | SYSTOLIC BLOOD PRESSURE: 132 MMHG | RESPIRATION RATE: 18 BRPM | HEART RATE: 85 BPM | OXYGEN SATURATION: 95 % | DIASTOLIC BLOOD PRESSURE: 92 MMHG

## 2023-11-07 LAB
ALBUMIN SERPL BCG-MCNC: 3.7 G/DL (ref 3.5–5.2)
ALP SERPL-CCNC: 50 U/L (ref 35–104)
ALT SERPL W P-5'-P-CCNC: 12 U/L (ref 0–50)
ANION GAP SERPL CALCULATED.3IONS-SCNC: 10 MMOL/L (ref 7–15)
AST SERPL W P-5'-P-CCNC: 18 U/L (ref 0–45)
BILIRUB SERPL-MCNC: 0.6 MG/DL
BUN SERPL-MCNC: 14.7 MG/DL (ref 8–23)
CALCIUM SERPL-MCNC: 9.3 MG/DL (ref 8.8–10.2)
CHLORIDE SERPL-SCNC: 104 MMOL/L (ref 98–107)
CREAT SERPL-MCNC: 0.68 MG/DL (ref 0.51–0.95)
DEPRECATED HCO3 PLAS-SCNC: 26 MMOL/L (ref 22–29)
EGFRCR SERPLBLD CKD-EPI 2021: 84 ML/MIN/1.73M2
ERYTHROCYTE [DISTWIDTH] IN BLOOD BY AUTOMATED COUNT: 14.3 % (ref 10–15)
GLUCOSE SERPL-MCNC: 97 MG/DL (ref 70–99)
HCT VFR BLD AUTO: 35.5 % (ref 35–47)
HGB BLD-MCNC: 11.7 G/DL (ref 11.7–15.7)
MCH RBC QN AUTO: 31.6 PG (ref 26.5–33)
MCHC RBC AUTO-ENTMCNC: 33 G/DL (ref 31.5–36.5)
MCV RBC AUTO: 96 FL (ref 78–100)
PLATELET # BLD AUTO: 260 10E3/UL (ref 150–450)
POTASSIUM SERPL-SCNC: 4.5 MMOL/L (ref 3.4–5.3)
PROT SERPL-MCNC: 6.5 G/DL (ref 6.4–8.3)
RBC # BLD AUTO: 3.7 10E6/UL (ref 3.8–5.2)
SODIUM SERPL-SCNC: 140 MMOL/L (ref 135–145)
WBC # BLD AUTO: 13.1 10E3/UL (ref 4–11)

## 2023-11-07 PROCEDURE — 250N000011 HC RX IP 250 OP 636: Performed by: PHYSICIAN ASSISTANT

## 2023-11-07 PROCEDURE — 96376 TX/PRO/DX INJ SAME DRUG ADON: CPT

## 2023-11-07 PROCEDURE — 94640 AIRWAY INHALATION TREATMENT: CPT

## 2023-11-07 PROCEDURE — 999N000147 HC STATISTIC PT IP EVAL DEFER

## 2023-11-07 PROCEDURE — G0378 HOSPITAL OBSERVATION PER HR: HCPCS

## 2023-11-07 PROCEDURE — 36415 COLL VENOUS BLD VENIPUNCTURE: CPT | Performed by: PHYSICIAN ASSISTANT

## 2023-11-07 PROCEDURE — 250N000009 HC RX 250: Performed by: PHYSICIAN ASSISTANT

## 2023-11-07 PROCEDURE — 80053 COMPREHEN METABOLIC PANEL: CPT | Performed by: PHYSICIAN ASSISTANT

## 2023-11-07 PROCEDURE — 999N000157 HC STATISTIC RCP TIME EA 10 MIN

## 2023-11-07 PROCEDURE — 99239 HOSP IP/OBS DSCHRG MGMT >30: CPT | Performed by: PHYSICIAN ASSISTANT

## 2023-11-07 PROCEDURE — 250N000013 HC RX MED GY IP 250 OP 250 PS 637: Performed by: PHYSICIAN ASSISTANT

## 2023-11-07 PROCEDURE — 85027 COMPLETE CBC AUTOMATED: CPT | Performed by: PHYSICIAN ASSISTANT

## 2023-11-07 RX ORDER — AZITHROMYCIN 250 MG/1
TABLET, FILM COATED ORAL
Qty: 3 TABLET | Refills: 0 | Status: SHIPPED | OUTPATIENT
Start: 2023-11-08

## 2023-11-07 RX ORDER — IPRATROPIUM BROMIDE AND ALBUTEROL SULFATE 2.5; .5 MG/3ML; MG/3ML
3 SOLUTION RESPIRATORY (INHALATION) EVERY 6 HOURS PRN
Qty: 90 ML | Refills: 1 | Status: SHIPPED | OUTPATIENT
Start: 2023-11-07

## 2023-11-07 RX ORDER — ACETYLCYSTEINE 200 MG/ML
4 SOLUTION ORAL; RESPIRATORY (INHALATION) EVERY 4 HOURS PRN
Status: DISCONTINUED | OUTPATIENT
Start: 2023-11-07 | End: 2023-11-07 | Stop reason: HOSPADM

## 2023-11-07 RX ORDER — PREDNISONE 10 MG/1
TABLET ORAL
Qty: 30 TABLET | Refills: 0 | Status: SHIPPED | OUTPATIENT
Start: 2023-11-07

## 2023-11-07 RX ADMIN — IPRATROPIUM BROMIDE AND ALBUTEROL SULFATE 3 ML: .5; 3 SOLUTION RESPIRATORY (INHALATION) at 08:28

## 2023-11-07 RX ADMIN — METHYLPREDNISOLONE SODIUM SUCCINATE 40 MG: 40 INJECTION, POWDER, FOR SOLUTION INTRAMUSCULAR; INTRAVENOUS at 09:07

## 2023-11-07 RX ADMIN — IPRATROPIUM BROMIDE AND ALBUTEROL SULFATE 3 ML: .5; 3 SOLUTION RESPIRATORY (INHALATION) at 13:31

## 2023-11-07 RX ADMIN — AZITHROMYCIN DIHYDRATE 250 MG: 250 TABLET ORAL at 09:08

## 2023-11-07 RX ADMIN — IPRATROPIUM BROMIDE AND ALBUTEROL SULFATE 3 ML: .5; 3 SOLUTION RESPIRATORY (INHALATION) at 02:00

## 2023-11-07 ASSESSMENT — ACTIVITIES OF DAILY LIVING (ADL)
ADLS_ACUITY_SCORE: 35

## 2023-11-07 NOTE — PROGRESS NOTES
Physical Therapy Discharge Summary    Reason for therapy discharge:       11/07/23 1100   Appointment Info   Appointment Cancel Comments (PT) PT order acknowledged. Pt admit with COPD exacerbation, initially on O2. Per chart and RN, pt is now up independently on RA, plan for formal supplemental O2 need assessment. No balance nor safe mobility concerns noted. PT, RN and PA in agreement to hold PT eval at this time.   Living Environment   Transportation Anticipated family or friend will provide         Progress towards therapy goal(s). See goals on Care Plan in [facility] electronic health record for goal details.  NA    Therapy recommendation(s):    NA

## 2023-11-07 NOTE — PLAN OF CARE
Observation goals  PRIOR TO DISCHARGE        Comments:   -diagnostic tests and consults completed and resulted: not met  -vital signs normal or at patient baseline: met  -tolerating oral intake to maintain hydration: met  -dyspnea improved and O2 sats greater than 88% on room air or prior home oxygen levels: met  -safe disposition plan has been identified: not met  Nurse to notify provider when observation goals have been met and patient is ready for discharge         PRIMARY Concern: COPD exacerbation   SAFETY RISK Concerns (fall risk, behaviors, etc.): No  Isolation/Type: none  Tests/Procedures for NEXT shift: AM labs, Home O2 test  Consults? (Pending/following, signed-off?) PT, OT, & SW consult  Where is patient from? (Home, TCU, etc.): Home  Other Important info for NEXT shift: Pt [place], use small BP cuff  Anticipated DC date & active delays: TBD  __________________________________________________________________________  SUMMARY NOTE:  Orientation/Cognitive: A&Ox4  Observation Goals (Met/ Not Met): Not met  Mobility Level/Assist Equipment: Ind w/ walker  Antibiotics & Plan (IV/po, length of tx left): IV azithromycin for 5 days  Pain Management: denies pain  Tele/VS/O2: VSS, O2 RA  ABNL Lab/BG: K+ RN Managed protocol, recheck in AM  Diet: Reg  Bowel/Bladder: continent of B&B  Skin Concerns: none  Drains/Devices: PIV SL

## 2023-11-07 NOTE — PROGRESS NOTES
diagnostic tests and consults completed and resulted No  -vital signs normal or at patient baseline No, still tachycardiac  -tolerating oral intake to maintain hydration Yes  -dyspnea improved and O2 sats greater than 88% on room air or prior home oxygen levels Yes  -safe disposition plan has been identified Yes

## 2023-11-07 NOTE — UTILIZATION REVIEW
Concurrent stay review; Secondary Review Determination     [facility]          Under the authority of the Utilization Management Committee, the utilization review process indicated a secondary review on the above patient.  The review outcome is based on review of the medical records, discussions with staff, and applying clinical experience noted on the date of the review.          (x) Observation Status Appropriate - Concurrent stay review    RATIONALE FOR DETERMINATION    88-year-old female with a significant medical history, including COPD, depression, FOUZIA, microscopic colitis, and PMR, was admitted to observation on 11/6/2023 for a mild COPD exacerbation. She presented with several weeks of shortness of breath, previously treated with a 5-day course of steroids while traveling but experienced a recurrence of symptoms. She denied fever, chills, or recent illnesses, and her examination revealed prolonged expiratory wheezing prior to nebulizer treatments. Labs indicated a potassium level of 3.2, and an EKG showed T-wave inversions in lead 3 and V3-5. The patient desaturated with ambulation and had central vascular congestion and mild edema/chronic COPD changes on CXR. She was admitted to observation due to failed outpatient management and treated with IV methylprednisolone, nebulizer therapy, and other supportive measures.  No hypoxia oxygen saturation normal today.  Her symptoms, including shortness of breath and wheezing, were not adequately controlled in the outpatient setting, leading to the need for observation and further management. However, there are no indications or conditions that suggest a prolonged hospital inpatient stay is warranted. Her examination did not reveal severe respiratory distress, acute complications, or significant comorbidities that would necessitate an extended hospitalization.   Patient is clinically improving and there is no clear indication to change patient's  status to inpatient. The severity of illness, intensity of service provided, expected LOS and risk for adverse outcome make the care appropriate for observation.      This document was produced using voice recognition software       The information on this document is developed by the utilization review team in order for the business office to ensure compliance.  This only denotes the appropriateness of proper admission status and does not reflect the quality of care rendered.         The definitions of Inpatient Status and Observation Status used in making the determination above are those provided in the CMS Coverage Manual, Chapter 1 and Chapter 6, section 70.4.      Sincerely,     MICHI MILLER MD    System Medical Director  Utilization Management  [facility].

## 2023-11-07 NOTE — PROGRESS NOTES
Care Management Discharge Note    Discharge Date: 11/07/2023       Discharge Disposition: Home    Discharge Services: None    Discharge DME: Nebulizer machine    Discharge Transportation: family or friend will provide    Private pay costs discussed: Not applicable    Does the patient's insurance plan have a 3 day qualifying hospital stay waiver?  No    PAS Confirmation Code:    Patient/family educated on Medicare website which has current facility and service quality ratings: no    Education Provided on the Discharge Plan: No  Persons Notified of Discharge Plans: patient and bedside RN  Patient/Family in Agreement with the Plan: yes    Handoff Referral Completed: Yes    Additional Information:  Writer received a call back from [facility], they will be delivering the neb machine shortly. Writer relayed to patient and bedside.     Sandra Hsu RN       Finasteride Male Counseling: Finasteride Counseling:  I discussed with the patient the risks of use of finasteride including but not limited to decreased libido, decreased ejaculate volume, gynecomastia, and depression. Women should not handle medication.  All of the patient's questions and concerns were addressed. Finasteride Counseling:  I discussed with the patient the risks of use of finasteride including but not limited to decreased libido, decreased ejaculate volume, gynecomastia, and depression. Women should not handle medication.  All of the patient's questions and concerns were addressed.

## 2023-11-07 NOTE — PROGRESS NOTES
Observation goals  PRIOR TO DISCHARGE        Comments:   -diagnostic tests and consults completed and resulted: not met  -vital signs normal or at patient baseline: met  -tolerating oral intake to maintain hydration: met  -dyspnea improved and O2 sats greater than 88% on room air or prior home oxygen levels: met  -safe disposition plan has been identified: not met  Nurse to notify provider when observation goals have been met and patient is ready for discharge.

## 2023-11-07 NOTE — PROGRESS NOTES
"RCAT Treatment Plan    Patient Score: 4  Patient Acuity: 5    Clinical Indication for Therapy: Assess and adjust aerosol therapy/ Pulmonary hygiene    Therapy Ordered: Mucomyst Modified Q4h PRN.    Assessment Summary: RCAT was done. Pt scored acuity Level of 5. Pt is on RA with an SpO2 95% .breathing within the normal limit. Pt has a strong non-productive cough. Pt BS are Clear/ diminished throughout both lungs. CXR result (11/06/23) showed \" Diffuse interstitial  coarsening suspected to reflect a combination of mild edema and chronic changes\".Pt was given nebulizer treatment and pt tolerated well. Aerosol therapy plan was modified and for more details see Flowsheet. RT will continue to follow.        Cesar Austin, RT  11/7/2023   "

## 2023-11-07 NOTE — CONSULTS
Care Management Initial Consult    General Information  Assessment completed with: Sheryl Bailey  Type of CM/SW Visit: Initial Assessment    Primary Care Provider verified and updated as needed: Yes   Readmission within the last 30 days: no previous admission in last 30 days      Reason for Consult: discharge planning  Advance Care Planning: Advance Care Planning Reviewed: verified with patient          Communication Assessment  Patient's communication style: spoken language (English or Bilingual)             Cognitive  Cognitive/Neuro/Behavioral: WDL                      Living Environment:   People in home: alone     Current living Arrangements: apartment      Able to return to prior arrangements: yes       Family/Social Support:  Care provided by: self  Provides care for:    Marital Status:              Description of Support System:           Current Resources:   Patient receiving home care services: No     Community Resources: None  Equipment currently used at home:    Supplies currently used at home: None    Employment/Financial:  Employment Status: retired        Financial Concerns: none           Does the patient's insurance plan have a 3 day qualifying hospital stay waiver?  No    Lifestyle & Psychosocial Needs:  Social Determinants of Health     Food Insecurity: Not on file   Depression: Not on file   Housing Stability: Not on file   Tobacco Use: Medium Risk (11/6/2023)    Patient History     Smoking Tobacco Use: Former     Smokeless Tobacco Use: Never     Passive Exposure: Not on file   Financial Resource Strain: Not on file   Alcohol Use: Not on file   Transportation Needs: Not on file   Physical Activity: Not on file   Interpersonal Safety: Not on file   Stress: Not on file   Social Connections: Not on file       Functional Status:  Prior to admission patient needed assistance:    Independent          Mental Health Status:  Mental Health Status: No Current Concerns       Chemical Dependency  Status:      unknwon          Values/Beliefs:  Spiritual, Cultural Beliefs, Sikhism Practices, Values that affect care: no               Additional Information:  -writer met with patient. Went over Medicare Outpatient Observation Notice. Patient lives independently in an apartment. She walks independently and if she needs anything, her son and or friend help her.   -writer was informed that patient would need a home nebulizer machine writer will help coordinate this.  -wrier called [facility] for Nebulizer machine, waiting on call back.  Sandra Hsu RN

## 2023-11-07 NOTE — DISCHARGE SUMMARY
[facility]  Hospitalist Discharge Summary      Date of Admission:  11/6/2023  Date of Discharge:  11/7/2023  Discharging Provider: Rachelle Miller PA-C  Discharge Service: Hospitalist Service    Discharge Diagnoses   Acute COPD exacerbation, recurrent  Steroid induced leukocytosis  High blood pressure without diagnosis of hypertension, improving  Mild hypokalemia    Clinically Significant Risk Factors          Follow-ups Needed After Discharge   Follow-up Appointments     Follow-up and recommended labs and tests       Follow up with primary care provider, Uriel Branch, within 7 days   for hospital follow- up.  Monitor how you are doing with steroids and if   you need any additional, as well as discussing steroid inhaler.            Discharge Disposition   Discharged to home  Condition at discharge: Stable    Hospital Course   Anitha Gauthier is a 88 year old female with PMH significant for COPD, depression, FOUZIA, microscopic colitis, PMR who was registered to observation on 11/6/2023 with mild COPD exacerbation. For full HPI please see admission H&P from myself, dated 11/6/23.     Acute COPD exacerbation, recurrent  Presented with a few weeks of SOB, seen 10/18 when traveling in [place] for similar, felt improved after 5 day course of steroids but now symptoms worsened again. No fevers, chills, or recent illnesses. No CP. No peripheral edema. No lightheadedness, dizziness. Right sided pleuritic pain with prior exacerbation, none recently. Lives alone, independent, drives, no ambulatory devices. Follows at [facility], previously on Advair, switched to Spiriva due to osteoporosis. Last COPD flare 2 years ago. Hypertensive, 92% on RA in the ED. No leukocytosis. Prolonged expiratory wheezing on exam prior to nebs. Labs significant for K 3.2, stable Cr, proBNP/Troponin WNL, No leukocytosis. Neg resp panel - covid/flu/rsv. EKG TWI lead 3, V3-5. Desaturates with ambulation. Sinus tach post  nebs. CXR central vascular congestion, mild edema/chronic changes of COPD, trace Left sided pleural effusion.     Registered to observation.  Initiated on Methylprednisolone IV 60mg x1 followed by 40 mg x 1 IV.  She had nebulizers, flutter valve, and azithromycin.  She was independent and did not have physical therapy needs.  She improved quickly and felt ready to go home day of discharge.  I discussed with patient continuing her course of azithromycin as well as a prolonged course of steroid taper. She was provided nebulizer machine and medcation. I discharged her with a 14-day course steroid taper and she already has an appointment next week with primary care on 11/16.  At that juncture she can discuss whether she needs additional steroids and whether she should reinitiate her inhaled corticosteroid, risk versus benefit conversation to be had between patient and PCP.     Steroid induced leukocytosis  WBC WNL and increased to 13.1 post steroids. No other s/s infection.     High blood pressure without diagnosis of hypertension, improving  Hx white coat syndrome. Monitor.  -PRN labetalol/hydralazine available, avoid use unless prolonged elevation  -Use small BP cuff  -Improving with treatment of acute issues above     Mild hypokalemia: Check and replete per protocol     Chronic stable diagnoses and other pertinent medical history: Appropriate PTA medications will be resumed. Nonessential medications will be held if patient is observation status.   Microscopic colitis  Sensorineural hearing loss  PMR  Osteoporosis  Dysthymic disorder  Generalized anxiety disorder  Skin cancer  Midline cystocele  Fibrocystic breast disease     Consultations This Hospital Stay   RESPIRATORY CARE IP CONSULT  CARE MANAGEMENT / SOCIAL WORK IP CONSULT  PHYSICAL THERAPY ADULT IP CONSULT  OCCUPATIONAL THERAPY ADULT IP CONSULT    Code Status   No CPR- Do NOT Intubate    Time Spent on this Encounter   I, Rachelle Miller PA-C, personally  saw the patient today and spent greater than 30 minutes discharging this patient.       TAMANNA Richey [facility] EXTENDED RECOVERY AND SHORT STAY  [address] [address]  Phone: [phone]  ______________________________________________________________________    Physical Exam   Vital Signs: Temp: 98.4  F (36.9  C) Temp src: Oral BP: (!) 132/92 Pulse: 85   Resp: 18 SpO2: 95 % O2 Device: None (Room air)    Weight: 0 lbs 0 oz    General: Awake, alert, very pleasant thin elderly woman who appears stated age. Looks comfortable sitting up in bed. No acute distress.  HEENT: Normocephalic, atraumatic. Extraocular movements intact.   Respiratory: Improved breath sounds. No wheezing, rhonchi, or rales. Speaks in full sentences, no hypoxia, no supplemental oxygen, no accessory muscle use.   Cardiovascular: Regular rate and rhythm, +S1 and S2, no murmur auscultated. No peripheral edema.   Gastrointestinal: Soft, non-tender, non-distended. Bowel sounds present.  Skin: Warm, dry. No obvious rashes or lesions on exposed skin. Dorsalis pedis pulses palpable bilaterally.  Musculoskeletal: No joint swelling, erythema or tenderness. Moves all extremities equally.  Neurologic: AAO x3.   Psychiatric: Appropriate mood and affect. No obvious anxiety or depression.  Home       Primary Care Physician   Uriel Branch    Discharge Orders      NEBULIZER, WITH COMPRESSOR     Reason for your hospital stay    You were admitted with COPD flare, you improved with nebulizers and steroids     Follow-up and recommended labs and tests     Follow up with primary care provider, Uriel Branch, within 7 days for hospital follow- up.  Monitor how you are doing with steroids and if you need any additional, as well as discussing steroid inhaler.     Activity    Your activity upon discharge: activity as tolerated     Discharge Instructions    Rest when you feel out of breath  You may take  nebulizers twice daily for the next 2-3 days and then as needed.   You should get a basket with wheels you can pull/push for your groceries rather than carrying     Diet    Follow this diet upon discharge: Orders Placed This Encounter      Regular Diet Adult       Significant Results and Procedures   Results for orders placed or performed during the hospital encounter of 11/06/23   XR Chest 2 Views    Narrative    XR CHEST 2 VIEWS 11/6/2023 7:01 AM    HISTORY: Shortness of breath    COMPARISON: None available      Impression    IMPRESSION: Central vascular congestion. Diffuse interstitial  coarsening suspected to reflect a combination of mild edema and  chronic changes. Trace left-sided pleural fluid. No focal pneumonic  consolidation. Upper normal heart size. Age indeterminate but likely  old upper and lower thoracic vertebral body compression fractures.    MED CANTU MD         SYSTEM ID:  [id number]       Discharge Medications   Discharge Medication List as of 11/7/2023  2:12 PM        START taking these medications    Details   ipratropium - albuterol 0.5 mg/2.5 mg/3 mL (DUONEB) 0.5-2.5 (3) MG/3ML neb solution Take 1 vial (3 mLs) by nebulization every 6 hours as needed for shortness of breath, wheezing or cough, Disp-90 mL, R-1, E-PrescribeFuture refills by PCP Dr. Uriel Branch with phone number [phone].      predniSONE (DELTASONE) 10 MG tablet 4 tabs daily for 3 days, then 3 tabs daily for 3 days, then 2 tabs daily for 3 days, then 1 tab daily for 3 days, then stop, Disp-30 tablet, R-0, E-PrescribeFuture refills by PCP Dr. Uriel Branch with phone number [phone].           CONTINUE these medications which have CHANGED    Details   azithromycin (ZITHROMAX Z-JESSICA) 250 MG tablet Two tablets on the first day, then one tablet daily for the next 4 days, Disp-3 tablet, R-0, E-PrescribeFuture refills by PCP Dr. Uriel Branch with phone number [phone].           CONTINUE these  medications which have NOT CHANGED    Details   acyclovir (ZOVIRAX) 400 MG tablet Take 400 mg by mouth 3 times daily During an outbreak, Historical      albuterol (PROAIR HFA/PROVENTIL HFA/VENTOLIN HFA) 108 (90 Base) MCG/ACT inhaler Inhale 1-2 puffs into the lungs every 4 hours as needed for cough, Historical      CALCIUM /VITAMIN D TABS 600-125 MG-IU OR 1 TABLET DAILY, Historical      cholestyramine (QUESTRAN) 4 GM/DOSE powder Take 1/2 teaspoon daily, Historical      clonazePAM (KLONOPIN) 0.5 MG tablet Take 0.25 mg by mouth 2 times daily as needed for anxiety, Historical      FOSAMAX 70 MG OR TABS ONE TABLET WEEKLY, Disp-3 MONTHS, R-3, Fax      multivitamin w/minerals (THERA-VIT-M) tablet Take 1 tablet by mouth daily, Historical      PARoxetine (PAXIL) 10 MG tablet Take 1 tablet by mouth every morning, Historical      tiotropium (SPIRIVA RESPIMAT) 2.5 MCG/ACT inhaler Inhale 2 puffs into the lungs daily, Historical           Allergies   Allergies   Allergen Reactions    Morphine And Related Nausea and Vomiting    Sulfa Antibiotics Hives

## 2023-11-07 NOTE — PLAN OF CARE
Goal Outcome Evaluation:    A&OX4, VSS on RA. Denies pain, SOB, n&V. Up ind. All discharge meds and instructions including nebulizer machine instructions reviewed with pt, pt verbalized understanding and able to teach back. All belongings returned to pt. Stable at time of discharge.

## 2023-11-09 ENCOUNTER — PATIENT OUTREACH (OUTPATIENT)
Dept: CARE COORDINATION | Facility: CLINIC | Age: 88
End: 2023-11-09
Payer: MEDICARE

## 2023-11-09 NOTE — PROGRESS NOTES
Connected Care Resource Center Contact  [facility]/Voicemail     Clinical Data: Post-Discharge Outreach     Outreach attempted x 2.  Left message on patient's voicemail, providing [facility]'s central phone number of [phone] ([phone]) for questions/concerns and/or to schedule an appt with an [facility] provider, if they do not have a PCP.      Plan:  [facility] will do no further outreaches at this time.       Aurea Donovan RN  Connected [facility] Resource Center, [facility]    *Connected Care Resource Team does NOT follow patient ongoing. Referrals are identified based on internal discharge reports and the outreach is to ensure patient has an understanding of their discharge instructions.

## 2023-12-08 ENCOUNTER — TRANSFERRED RECORDS (OUTPATIENT)
Dept: HEALTH INFORMATION MANAGEMENT | Facility: CLINIC | Age: 88
End: 2023-12-08

## 2023-12-08 ENCOUNTER — APPOINTMENT (OUTPATIENT)
Dept: GENERAL RADIOLOGY | Facility: CLINIC | Age: 88
End: 2023-12-08
Attending: EMERGENCY MEDICINE
Payer: MEDICARE

## 2023-12-08 ENCOUNTER — HOSPITAL ENCOUNTER (EMERGENCY)
Facility: CLINIC | Age: 88
Discharge: HOME | End: 2023-12-08
Attending: EMERGENCY MEDICINE | Admitting: EMERGENCY MEDICINE
Payer: MEDICARE

## 2023-12-08 VITALS
WEIGHT: 106 LBS | DIASTOLIC BLOOD PRESSURE: 103 MMHG | SYSTOLIC BLOOD PRESSURE: 162 MMHG | HEIGHT: 61 IN | OXYGEN SATURATION: 96 % | HEART RATE: 82 BPM | BODY MASS INDEX: 20.01 KG/M2 | TEMPERATURE: 97 F | RESPIRATION RATE: 13 BRPM

## 2023-12-08 DIAGNOSIS — R07.9 CHEST PAIN, UNSPECIFIED TYPE: ICD-10-CM

## 2023-12-08 LAB
ANION GAP SERPL CALCULATED.3IONS-SCNC: 10 MMOL/L (ref 7–15)
ATRIAL RATE - MUSE: 80 BPM
BUN SERPL-MCNC: 14.5 MG/DL (ref 8–23)
CALCIUM SERPL-MCNC: 9.6 MG/DL (ref 8.8–10.2)
CHLORIDE SERPL-SCNC: 102 MMOL/L (ref 98–107)
CREAT SERPL-MCNC: 0.61 MG/DL (ref 0.51–0.95)
DEPRECATED HCO3 PLAS-SCNC: 27 MMOL/L (ref 22–29)
DIASTOLIC BLOOD PRESSURE - MUSE: NORMAL MMHG
EGFRCR SERPLBLD CKD-EPI 2021: 86 ML/MIN/1.73M2
ERYTHROCYTE [DISTWIDTH] IN BLOOD BY AUTOMATED COUNT: 14.2 % (ref 10–15)
GLUCOSE SERPL-MCNC: 89 MG/DL (ref 70–99)
HCT VFR BLD AUTO: 39.4 % (ref 35–47)
HGB BLD-MCNC: 12.8 G/DL (ref 11.7–15.7)
INTERPRETATION ECG - MUSE: NORMAL
MCH RBC QN AUTO: 31.8 PG (ref 26.5–33)
MCHC RBC AUTO-ENTMCNC: 32.5 G/DL (ref 31.5–36.5)
MCV RBC AUTO: 98 FL (ref 78–100)
P AXIS - MUSE: 76 DEGREES
PLATELET # BLD AUTO: 351 10E3/UL (ref 150–450)
POTASSIUM SERPL-SCNC: 4.4 MMOL/L (ref 3.4–5.3)
PR INTERVAL - MUSE: 132 MS
QRS DURATION - MUSE: 86 MS
QT - MUSE: 374 MS
QTC - MUSE: 431 MS
R AXIS - MUSE: 99 DEGREES
RBC # BLD AUTO: 4.03 10E6/UL (ref 3.8–5.2)
SODIUM SERPL-SCNC: 139 MMOL/L (ref 135–145)
SYSTOLIC BLOOD PRESSURE - MUSE: NORMAL MMHG
T AXIS - MUSE: -12 DEGREES
TROPONIN T SERPL HS-MCNC: 6 NG/L
TROPONIN T SERPL HS-MCNC: <6 NG/L
VENTRICULAR RATE- MUSE: 80 BPM
WBC # BLD AUTO: 8 10E3/UL (ref 4–11)

## 2023-12-08 PROCEDURE — 99285 EMERGENCY DEPT VISIT HI MDM: CPT | Mod: 25

## 2023-12-08 PROCEDURE — 85027 COMPLETE CBC AUTOMATED: CPT | Performed by: EMERGENCY MEDICINE

## 2023-12-08 PROCEDURE — 71046 X-RAY EXAM CHEST 2 VIEWS: CPT

## 2023-12-08 PROCEDURE — 84484 ASSAY OF TROPONIN QUANT: CPT | Mod: XU | Performed by: EMERGENCY MEDICINE

## 2023-12-08 PROCEDURE — 82310 ASSAY OF CALCIUM: CPT | Performed by: EMERGENCY MEDICINE

## 2023-12-08 PROCEDURE — 36415 COLL VENOUS BLD VENIPUNCTURE: CPT | Performed by: EMERGENCY MEDICINE

## 2023-12-08 PROCEDURE — 93005 ELECTROCARDIOGRAM TRACING: CPT

## 2023-12-08 PROCEDURE — 84484 ASSAY OF TROPONIN QUANT: CPT | Performed by: EMERGENCY MEDICINE

## 2023-12-08 ASSESSMENT — ACTIVITIES OF DAILY LIVING (ADL): ADLS_ACUITY_SCORE: 35

## 2023-12-08 NOTE — ED PROVIDER NOTES
"  History     Chief Complaint:  Chest Pain and Jaw Pain       The history is provided by the patient.      Anitha Gauthier is a 88 year old female with a history of COPD who presents due to jaw pain and chest sensation that began at 0815 today. The patient reports that she that her symptoms began before at an eye appointment. She first developed the jaw pain followed by a sensation in her chest that lasted for five minutes before resolving. She states that she doesn't know how to describe her pain but denies stabbing, aching, or burning pain Denies any chest pressure.. The patient has had no reoccurrence of pain since episode that occurred today. She notes that she has had previous episodes of jaw pain beginning one year ago. She has bought a mouth guard due to symptoms. States she has had intermittent chest pain similar to today up to 8 times in the last year.The patient denies leg swelling or diaphoresis. She has no prior heart issues or history of hypertension.     Independent Historian:   None - Patient Only    Review of External Notes:      Reviewed urgent care note and patient was sent here for further evaluation for chest pain.    Medications:    Albuterol   Klonopin   Fosamax   Paxil   Tiotropium     Past Medical History:    Depression   Cystic mastopathy   Osteoporosis   Skin cancer   Anxiety   COPD   Stricture and stenosis of esophagus   TMJ   Polymyalgia rheumatica      Past Surgical History:    Colonoscopy x2  Cholecystectomy   Eye procedure   Cataract removal       Physical Exam   Patient Vitals for the past 24 hrs:   BP Temp Temp src Pulse Resp SpO2 Height Weight   12/08/23 1432 -- -- -- 82 13 96 % -- --   12/08/23 1430 (!) 162/103 -- -- 76 17 99 % -- --   12/08/23 1428 -- -- -- 80 21 98 % -- --   12/08/23 1416 -- -- -- 83 24 95 % -- --   12/08/23 1237 (!) 169/90 97  F (36.1  C) Temporal 89 18 95 % 1.549 m (5' 1\") 48.1 kg (106 lb)        Physical Exam  Vitals reviewed.   Constitutional:       " General: She is not in acute distress.     Appearance: She is not ill-appearing.   HENT:      Head: Normocephalic and atraumatic.   Eyes:      Extraocular Movements: Extraocular movements intact.   Cardiovascular:      Rate and Rhythm: Normal rate and regular rhythm.   Pulmonary:      Effort: Pulmonary effort is normal. No respiratory distress.      Breath sounds: Normal breath sounds. No wheezing.   Abdominal:      Palpations: Abdomen is soft.      Tenderness: There is no abdominal tenderness. There is no guarding.   Musculoskeletal:      Cervical back: Normal range of motion.      Right lower leg: No edema.      Left lower leg: No edema.   Skin:     General: Skin is warm and dry.   Neurological:      Mental Status: She is alert and oriented to person, place, and time.      GCS: GCS eye subscore is 4. GCS verbal subscore is 5. GCS motor subscore is 6.   Psychiatric:         Behavior: Behavior normal.           Emergency Department Course   ECG  ECG taken at 1235, ECG read at 1240  Normal sinus rhythm   Rightward axis   Septal infarct, age undetermined   Abnormal ECG    ST elevation not present as compared to prior, dated 12/8/23.  Rate 80 bpm. PA interval 132 ms. QRS duration 86 ms. QT/QTc 374/431 ms. P-R-T axes 76 99 -12.     Imaging:  XR Chest 2 Views   Final Result   IMPRESSION: Heart is normal in size. Trace right effusion. Mild   prominence of the pulmonary interstitium, likely secondary to chronic   interstitial lung changes. No evidence of pneumonia.      RADHA BOLDEN MD            SYSTEM ID:  [id number]      Report per radiology.      Laboratory:  Labs Ordered and Resulted from Time of ED Arrival to Time of ED Departure   CBC WITH PLATELETS - Normal       Result Value    WBC Count 8.0      RBC Count 4.03      Hemoglobin 12.8      Hematocrit 39.4      MCV 98      MCH 31.8      MCHC 32.5      RDW 14.2      Platelet Count 351     BASIC METABOLIC PANEL - Normal    Sodium 139      Potassium 4.4      Chloride 102       Carbon Dioxide (CO2) 27      Anion Gap 10      Urea Nitrogen 14.5      Creatinine 0.61      GFR Estimate 86      Calcium 9.6      Glucose 89     TROPONIN T, HIGH SENSITIVITY - Normal    Troponin T, High Sensitivity 6     TROPONIN T, HIGH SENSITIVITY - Normal    Troponin T, High Sensitivity <6        Emergency Department Course & Assessments:         Assessments:  1419 I obtained history and examined the patient as noted above.   1533 I rechecked the patient and explained findings. We discussed plan for discharge and patient is in agreement with plan.      Independent Interpretation (X-rays, CTs, rhythm strip):  Chest x-ray reviewed no cardiomegaly or infiltrates identified.    Consultations/Discussion of Management or Tests:  None        Social Determinants of Health affecting care:   None    Disposition:  The patient was discharged to home.     Impression & Plan    CMS Diagnoses: None    Medical Decision Makin-year-old female history of COPD presenting today with an episode of chest pain and right-sided jaw pain that occurred earlier this morning.  She states she had an eye appointment that was sent to urgent care.  States that urgent care then sent her here.  States that intermittently over the last year she has had episodes of chest pain that she describes as a sensation throughout her chest.  Denies any chest pressure or sharp pain.  States that intermittently she also has had right-sided jaw pain that she thought was TMJ and got a mouthguard.  She states that she has no prior cardiac history.  She does have a history of COPD and has been compliant with her inhalers.  EKG appears similar to prior with normal sinus rhythm no ST elevation.  Troponin on arrival was 6 repeat was less than 6.  Patient updated on results she appeared hemodynamically stable.  Blood pressure elevated patient directed follow-up primary care doctor for reevaluation.  Return precautions discussed.  They verbalized understanding  agreement.      Diagnosis:    ICD-10-CM    1. Chest pain, unspecified type  R07.9          Scribe Disclosure:  I, Patience Romero, am serving as a scribe at 3:03 PM on 12/8/2023 to document services personally performed by Megan Avila DO based on my observations and the provider's statements to me.     12/8/2023   Megan Avila DO Doan, Tiffani, DO  12/08/23 1551

## 2023-12-08 NOTE — DISCHARGE INSTRUCTIONS
Follow-up with your doctor in 1 to 2 days    Return to the ER for any worsening or concerning symptoms.

## 2023-12-08 NOTE — ED TRIAGE NOTES
Around 0820 after eye appointment had right jaw pain and chest pain lasting 5 minutes. EKG done in  and sent to ED for abnormality.       Triage Assessment (Adult)       Row Name 12/08/23 1238          Triage Assessment    Airway WDL WDL        Respiratory WDL    Respiratory WDL WDL        Skin Circulation/Temperature WDL    Skin Circulation/Temperature WDL WDL        Cardiac WDL    Cardiac WDL X        Peripheral/Neurovascular WDL    Peripheral Neurovascular WDL WDL        Cognitive/Neuro/Behavioral WDL    Cognitive/Neuro/Behavioral WDL WDL

## 2024-12-21 ENCOUNTER — HEALTH MAINTENANCE LETTER (OUTPATIENT)
Age: 89
End: 2024-12-21

## 2025-02-05 ENCOUNTER — MEDICAL CORRESPONDENCE (OUTPATIENT)
Dept: HEALTH INFORMATION MANAGEMENT | Facility: CLINIC | Age: 89
End: 2025-02-05
Payer: MEDICARE

## 2025-02-07 ENCOUNTER — HOSPITAL ENCOUNTER (EMERGENCY)
Facility: CLINIC | Age: 89
Discharge: HOME | End: 2025-02-07
Attending: EMERGENCY MEDICINE | Admitting: EMERGENCY MEDICINE
Payer: MEDICARE

## 2025-02-07 ENCOUNTER — APPOINTMENT (OUTPATIENT)
Dept: CT IMAGING | Facility: CLINIC | Age: 89
End: 2025-02-07
Attending: EMERGENCY MEDICINE
Payer: MEDICARE

## 2025-02-07 VITALS
DIASTOLIC BLOOD PRESSURE: 97 MMHG | HEART RATE: 96 BPM | OXYGEN SATURATION: 96 % | RESPIRATION RATE: 16 BRPM | SYSTOLIC BLOOD PRESSURE: 153 MMHG | TEMPERATURE: 98 F

## 2025-02-07 DIAGNOSIS — K59.00 CONSTIPATION, UNSPECIFIED CONSTIPATION TYPE: ICD-10-CM

## 2025-02-07 DIAGNOSIS — R10.84 GENERALIZED ABDOMINAL PAIN: ICD-10-CM

## 2025-02-07 LAB
ALBUMIN SERPL BCG-MCNC: 4.2 G/DL (ref 3.5–5.2)
ALP SERPL-CCNC: 253 U/L (ref 40–150)
ALT SERPL W P-5'-P-CCNC: 76 U/L (ref 0–50)
ANION GAP SERPL CALCULATED.3IONS-SCNC: 11 MMOL/L (ref 7–15)
AST SERPL W P-5'-P-CCNC: 37 U/L (ref 0–45)
ATRIAL RATE - MUSE: 84 BPM
BASOPHILS # BLD AUTO: 0.1 10E3/UL (ref 0–0.2)
BASOPHILS NFR BLD AUTO: 1 %
BILIRUB SERPL-MCNC: 0.8 MG/DL
BUN SERPL-MCNC: 10.5 MG/DL (ref 8–23)
CALCIUM SERPL-MCNC: 9.8 MG/DL (ref 8.8–10.4)
CHLORIDE SERPL-SCNC: 97 MMOL/L (ref 98–107)
CREAT SERPL-MCNC: 0.63 MG/DL (ref 0.51–0.95)
DIASTOLIC BLOOD PRESSURE - MUSE: NORMAL MMHG
EGFRCR SERPLBLD CKD-EPI 2021: 85 ML/MIN/1.73M2
EOSINOPHIL # BLD AUTO: 0.2 10E3/UL (ref 0–0.7)
EOSINOPHIL NFR BLD AUTO: 2 %
ERYTHROCYTE [DISTWIDTH] IN BLOOD BY AUTOMATED COUNT: 14.6 % (ref 10–15)
FLUAV RNA SPEC QL NAA+PROBE: NEGATIVE
FLUBV RNA RESP QL NAA+PROBE: NEGATIVE
GLUCOSE SERPL-MCNC: 101 MG/DL (ref 70–99)
HCO3 SERPL-SCNC: 28 MMOL/L (ref 22–29)
HCT VFR BLD AUTO: 38.6 % (ref 35–47)
HGB BLD-MCNC: 12.8 G/DL (ref 11.7–15.7)
IMM GRANULOCYTES # BLD: 0 10E3/UL
IMM GRANULOCYTES NFR BLD: 1 %
INTERPRETATION ECG - MUSE: NORMAL
LIPASE SERPL-CCNC: 17 U/L (ref 13–60)
LYMPHOCYTES # BLD AUTO: 1.5 10E3/UL (ref 0.8–5.3)
LYMPHOCYTES NFR BLD AUTO: 18 %
MCH RBC QN AUTO: 31.1 PG (ref 26.5–33)
MCHC RBC AUTO-ENTMCNC: 33.2 G/DL (ref 31.5–36.5)
MCV RBC AUTO: 94 FL (ref 78–100)
MONOCYTES # BLD AUTO: 0.7 10E3/UL (ref 0–1.3)
MONOCYTES NFR BLD AUTO: 8 %
NEUTROPHILS # BLD AUTO: 5.6 10E3/UL (ref 1.6–8.3)
NEUTROPHILS NFR BLD AUTO: 70 %
NRBC # BLD AUTO: 0 10E3/UL
NRBC BLD AUTO-RTO: 0 /100
P AXIS - MUSE: 56 DEGREES
PLATELET # BLD AUTO: 358 10E3/UL (ref 150–450)
POTASSIUM SERPL-SCNC: 3.8 MMOL/L (ref 3.4–5.3)
PR INTERVAL - MUSE: 134 MS
PROT SERPL-MCNC: 7.1 G/DL (ref 6.4–8.3)
QRS DURATION - MUSE: 82 MS
QT - MUSE: 374 MS
QTC - MUSE: 441 MS
R AXIS - MUSE: 91 DEGREES
RBC # BLD AUTO: 4.12 10E6/UL (ref 3.8–5.2)
RSV RNA SPEC NAA+PROBE: NEGATIVE
SARS-COV-2 RNA RESP QL NAA+PROBE: NEGATIVE
SODIUM SERPL-SCNC: 136 MMOL/L (ref 135–145)
SYSTOLIC BLOOD PRESSURE - MUSE: NORMAL MMHG
T AXIS - MUSE: 64 DEGREES
VENTRICULAR RATE- MUSE: 84 BPM
WBC # BLD AUTO: 8.1 10E3/UL (ref 4–11)

## 2025-02-07 PROCEDURE — 258N000003 HC RX IP 258 OP 636: Performed by: EMERGENCY MEDICINE

## 2025-02-07 PROCEDURE — 83690 ASSAY OF LIPASE: CPT | Performed by: EMERGENCY MEDICINE

## 2025-02-07 PROCEDURE — 250N000011 HC RX IP 250 OP 636: Performed by: EMERGENCY MEDICINE

## 2025-02-07 PROCEDURE — 85025 COMPLETE CBC W/AUTO DIFF WBC: CPT | Performed by: EMERGENCY MEDICINE

## 2025-02-07 PROCEDURE — 250N000009 HC RX 250: Performed by: EMERGENCY MEDICINE

## 2025-02-07 PROCEDURE — 99285 EMERGENCY DEPT VISIT HI MDM: CPT | Mod: 25

## 2025-02-07 PROCEDURE — 96360 HYDRATION IV INFUSION INIT: CPT | Mod: 59

## 2025-02-07 PROCEDURE — 87637 SARSCOV2&INF A&B&RSV AMP PRB: CPT | Performed by: EMERGENCY MEDICINE

## 2025-02-07 PROCEDURE — 84295 ASSAY OF SERUM SODIUM: CPT | Performed by: EMERGENCY MEDICINE

## 2025-02-07 PROCEDURE — 96361 HYDRATE IV INFUSION ADD-ON: CPT

## 2025-02-07 PROCEDURE — 36415 COLL VENOUS BLD VENIPUNCTURE: CPT | Performed by: EMERGENCY MEDICINE

## 2025-02-07 PROCEDURE — 99284 EMERGENCY DEPT VISIT MOD MDM: CPT

## 2025-02-07 PROCEDURE — 74177 CT ABD & PELVIS W/CONTRAST: CPT

## 2025-02-07 RX ORDER — BISACODYL 5 MG/1
10 TABLET, DELAYED RELEASE ORAL DAILY PRN
Qty: 20 TABLET | Refills: 0 | Status: SHIPPED | OUTPATIENT
Start: 2025-02-07

## 2025-02-07 RX ORDER — POLYETHYLENE GLYCOL 3350 17 G/17G
1 POWDER, FOR SOLUTION ORAL DAILY
Qty: 527 G | Refills: 0 | Status: SHIPPED | OUTPATIENT
Start: 2025-02-07 | End: 2025-03-09

## 2025-02-07 RX ORDER — FLUTICASONE PROPIONATE AND SALMETEROL 250; 50 UG/1; UG/1
1 POWDER RESPIRATORY (INHALATION) 2 TIMES DAILY
COMMUNITY

## 2025-02-07 RX ORDER — OMEPRAZOLE 20 MG/1
20 CAPSULE, DELAYED RELEASE ORAL DAILY
COMMUNITY

## 2025-02-07 RX ORDER — DOCUSATE SODIUM 100 MG/1
100 CAPSULE, LIQUID FILLED ORAL 2 TIMES DAILY
Qty: 60 CAPSULE | Refills: 0 | Status: SHIPPED | OUTPATIENT
Start: 2025-02-07 | End: 2025-03-09

## 2025-02-07 RX ORDER — IOPAMIDOL 755 MG/ML
53 INJECTION, SOLUTION INTRAVASCULAR ONCE
Status: COMPLETED | OUTPATIENT
Start: 2025-02-07 | End: 2025-02-07

## 2025-02-07 RX ADMIN — SODIUM CHLORIDE 1000 ML: 9 INJECTION, SOLUTION INTRAVENOUS at 12:15

## 2025-02-07 RX ADMIN — IOPAMIDOL 53 ML: 755 INJECTION, SOLUTION INTRAVENOUS at 14:03

## 2025-02-07 RX ADMIN — SODIUM CHLORIDE 60 ML: 9 INJECTION, SOLUTION INTRAVENOUS at 14:03

## 2025-02-07 ASSESSMENT — ACTIVITIES OF DAILY LIVING (ADL)
ADLS_ACUITY_SCORE: 47

## 2025-02-07 NOTE — ED NOTES
[facility]    ED Nurse Handoff Report    ED Chief complaint: Chest Pain and Abdominal Pain      ED Diagnosis:   Final diagnoses:   None       Code Status: to be determined by provider    Allergies:   Allergies   Allergen Reactions    Morphine And Codeine Nausea and Vomiting    Sulfa Antibiotics Hives       Patient Story:  Pt reports recent PNA, CT on Wednesday, then starting W night, started having pain in her stomach that would radiate up to her chest, called 911. EMS did EKG, that pain went away, now again this morning she is having abdominal pain from lower abdomen up to chest. Pt noted some chills yesterday. No recent appetite     Focused Assessment:    Alert and oriented x4, independent at home. Denies nausea. Endorses constipation and abdominal pain.     Labs Ordered and Resulted from Time of ED Arrival to Time of ED Departure   COMPREHENSIVE METABOLIC PANEL - Abnormal       Result Value    Sodium 136      Potassium 3.8      Carbon Dioxide (CO2) 28      Anion Gap 11      Urea Nitrogen 10.5      Creatinine 0.63      GFR Estimate 85      Calcium 9.8      Chloride 97 (*)     Glucose 101 (*)     Alkaline Phosphatase 253 (*)     AST 37      ALT 76 (*)     Protein Total 7.1      Albumin 4.2      Bilirubin Total 0.8     LIPASE - Normal    Lipase 17     INFLUENZA A/B, RSV AND SARS-COV2 PCR - Normal    Influenza A PCR Negative      Influenza B PCR Negative      RSV PCR Negative      SARS CoV2 PCR Negative     CBC WITH PLATELETS AND DIFFERENTIAL    WBC Count 8.1      RBC Count 4.12      Hemoglobin 12.8      Hematocrit 38.6      MCV 94      MCH 31.1      MCHC 33.2      RDW 14.6      Platelet Count 358      % Neutrophils 70      % Lymphocytes 18      % Monocytes 8      % Eosinophils 2      % Basophils 1      % Immature Granulocytes 1      NRBCs per 100 WBC 0      Absolute Neutrophils 5.6      Absolute Lymphocytes 1.5      Absolute Monocytes 0.7      Absolute Eosinophils 0.2      Absolute Basophils 0.1       Absolute Immature Granulocytes 0.0      Absolute NRBCs 0.0         CT Abdomen Pelvis w Contrast   Final Result   IMPRESSION:    1.  Large amount of stool distending the ascending and transverse colon with transition to decompressed colon in the left lower quadrant. Adjacent fat stranding. Appearance suspicious for large bowel obstruction. Possible tethering of the transition    point without definitive twisting/volvulus. Consider surgical consultation.      2.  No bowel pneumatosis, free air, or focal fluid collection.      3.  Lower lung reticular and nodular opacities likely combination of chronic fibrosis and small airway infection/inflammation.            Treatments and/or interventions provided:      Medications   sodium chloride 0.9% BOLUS 1,000 mL (0 mLs Intravenous Stopped 2/7/25 1611)   iopamidol (ISOVUE-370) solution 53 mL (53 mLs Intravenous $Given 2/7/25 1403)   Saline Flush (60 mLs Intravenous $Given 2/7/25 1403)       Patient's response to treatments and/or interventions:    Tolerating well    To be done/followed up on inpatient unit:   See any in-patient orders    Does this patient have any cognitive concerns?:  n/a    Activity level - Baseline/Home:    Independent    Activity Level - Current:    Independent    Patient's Preferred language: English     Needed?: No    Isolation: None  Infection: Not Applicable  Patient tested for COVID 19 prior to admission: NO    Bariatric?: No    Vital Signs:   Vitals:    02/07/25 1128   BP: (!) 154/95   Pulse: 88   Resp: 16   Temp: 98  F (36.7  C)   SpO2: 97%       Cardiac Rhythm:Cardiac Rhythm: Normal sinus rhythm    Was the PSS-3 completed:   Yes  What interventions are required if any?               Family Comments: none present  OBS brochure/video discussed/provided to patient/family: N/A            For the majority of the shift this patient's behavior was Green.    ED NURSE PHONE NUMBER: [phone]

## 2025-02-07 NOTE — ED PROVIDER NOTES
Emergency Department Note      History of Present Illness     Chief Complaint   Chest Pain and Abdominal Pain      HPI   Anitha Gauthier is a 89 year old female with a history of COPD, and polymyalgia rheumatica presenting to the ED for evaluation of chest pain and abdominal pain. The patient reports that she was diagnosed with pneumonia over a week ago and she had a CT chest two days ago that still showed signs of infection. Two nights ago, she began having abdominal pain that radiated up to her chest in waves. She called 911, and her EKG was unremarkable, though she was hypertensive. She decided to stay home. She already finished a ten day course of antibiotics, and is not taking any antibiotics currently. No diarrhea, nausea, vomiting, shortness of breath, or leg swelling. The patient does not take a diuretic, and has no history of heart failure.     Independent Historian   None    Review of External Notes   I reviewed the CT chest without contrast from 2/5/25.   Impression  1. 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  2. More focal opacity and tree-in-bud nodular infiltrate in the right lower lobe could be acute on chronic infection.  3. Dilated pulmonary artery compatible with pulmonary hypertension.    Past Medical History     Medical History and Problem List   Diffuse cystic mastopathy  Osteoporosis  Pain in limb  Acute reaction to stress  Depressive disorder  Anxiety state  COPD (chronic obstructive pulmonary disease)  Female genital prolapse  Laryngospasm  Malignant neoplasm of skin  Malignant neoplasm of skin of face  Microscopic colitis  Margret-rectal abscess  Sensorineural hearing loss (SNHL) of both ears  Stricture and stenosis of esophagus  TMJ syndrome  PMR (polymyalgia rheumatica)  Shortness of breath  Wheezing    Medications   Zovirax  Proair  Zithromax  Questran  Klonopin  Fosamax  Duoneb  Paxil   Deltasone  Spiriva     Surgical History   Colonoscopy  x2  Cholecystectomy      Physical Exam     Patient Vitals for the past 24 hrs:   BP Temp Pulse Resp SpO2   02/07/25 1630 (!) 142/97 -- 96 -- 96 %   02/07/25 1610 (!) 158/94 -- 89 -- 97 %   02/07/25 1128 (!) 154/95 98  F (36.7  C) 88 16 97 %     Physical Exam  Constitutional: Vital signs reviewed as above  General: Alert  HEENT: Moist mucous membranes  Eyes: Conjunctiva normal.   Neck: Normal range of motion  Cardiovascular: Regular rate, Regular rhythm and normal heart sounds.  No MRG  Pulmonary/Chest: Diminished breath sounds on the right. No respiratory distress. Patient has no wheezes. Patient has no rales.   Abdominal: Mild diffuse abdominal tenderness without guarding. Soft. Positive bowel sounds. No MRG.  Musculoskeletal/Extremities: Full ROM.  Endo: No pitting edema  Neurological: Alert, no focal deficits.  Skin: Skin is warm and dry.   Psychiatric: Pleasant    Diagnostics     Lab Results   Labs Ordered and Resulted from Time of ED Arrival to Time of ED Departure   COMPREHENSIVE METABOLIC PANEL - Abnormal       Result Value    Sodium 136      Potassium 3.8      Carbon Dioxide (CO2) 28      Anion Gap 11      Urea Nitrogen 10.5      Creatinine 0.63      GFR Estimate 85      Calcium 9.8      Chloride 97 (*)     Glucose 101 (*)     Alkaline Phosphatase 253 (*)     AST 37      ALT 76 (*)     Protein Total 7.1      Albumin 4.2      Bilirubin Total 0.8     LIPASE - Normal    Lipase 17     INFLUENZA A/B, RSV AND SARS-COV2 PCR - Normal    Influenza A PCR Negative      Influenza B PCR Negative      RSV PCR Negative      SARS CoV2 PCR Negative     CBC WITH PLATELETS AND DIFFERENTIAL    WBC Count 8.1      RBC Count 4.12      Hemoglobin 12.8      Hematocrit 38.6      MCV 94      MCH 31.1      MCHC 33.2      RDW 14.6      Platelet Count 358      % Neutrophils 70      % Lymphocytes 18      % Monocytes 8      % Eosinophils 2      % Basophils 1      % Immature Granulocytes 1      NRBCs per 100 WBC 0      Absolute Neutrophils  5.6      Absolute Lymphocytes 1.5      Absolute Monocytes 0.7      Absolute Eosinophils 0.2      Absolute Basophils 0.1      Absolute Immature Granulocytes 0.0      Absolute NRBCs 0.0         Imaging   CT Abdomen Pelvis w Contrast   Final Result   IMPRESSION:    1.  Large amount of stool distending the ascending and transverse colon with transition to decompressed colon in the left lower quadrant. Adjacent fat stranding. Appearance suspicious for large bowel obstruction. Possible tethering of the transition    point without definitive twisting/volvulus. Consider surgical consultation.      2.  No bowel pneumatosis, free air, or focal fluid collection.      3.  Lower lung reticular and nodular opacities likely combination of chronic fibrosis and small airway infection/inflammation.        EKG   ECG taken at 1122, ECG read at 1210  Normal sinus rhythm  Rightward axis    Rate 84 bpm. DE interval 134 ms. QRS duration 82 ms. QT/QTc 374/441 ms. P-R-T axes 56 91 64.    Independent Interpretation   EKG from today shows large amount of stool in the ascending and transverse colon with decompressed colon left lower quadrant.  Concern was for possible large bowel obstruction.    ED Course      Medications Administered   Medications   sodium chloride 0.9% BOLUS 1,000 mL (0 mLs Intravenous Stopped 2/7/25 1611)   iopamidol (ISOVUE-370) solution 53 mL (53 mLs Intravenous $Given 2/7/25 1403)   Saline Flush (60 mLs Intravenous $Given 2/7/25 1403)       Procedures   Procedures     Discussion of Management   Colorectal surgery, Dr. Torres    ED Course   ED Course as of 02/07/25 1652   Fri Feb 07, 2025   1150 I obtained history and examined the patient as noted above.        Additional Documentation  None    Medical Decision Making / Diagnosis     CMS Diagnoses: None    MIPS       None    MDM   Anitha Gauthier is a 89 year old female with a recent history of pneumonia and now concerns for abdominal discomfort.  She is off of her  antibiotics.  She had a CT scan of her chest 2 days ago which show concern for chronic infection, possibly MAC throughout both lungs.  She does have a pulmonary consultation set up for that.  Her abdominal exam here is benign but is slightly distended.  Her viral panel came back negative here.  CBC, lipase, and comprehensive metabolic panel were significant only for an elevated alk phos of 253 and ALT is elevated 76.  CT scan does show a large amount of stool in the large intestine with concern for potential large bowel obstruction.  I discussed the case with colorectal surgery who evaluate the patient at bedside.  They feel this is not consistent with a large bowel obstruction but rather constipation.  She is currently feeling okay.  We discussed coming in for bowel prep or going home.  She prefers to go home.  I will discharge home with Colace twice daily, MiraLAX, and bisacodyl.  Continue with fluids.  Reasons to return were discussed and she was discharged home.    Disposition   The patient was discharged.     Diagnosis     ICD-10-CM    1. Generalized abdominal pain  R10.84       2. Constipation, unspecified constipation type  K59.00            Discharge Medications   New Prescriptions    BISACODYL (DULCOLAX) 5 MG EC TABLET    Take 2 tablets (10 mg) by mouth daily as needed for constipation.    DOCUSATE SODIUM (COLACE) 100 MG CAPSULE    Take 1 capsule (100 mg) by mouth 2 times daily.    POLYETHYLENE GLYCOL (MIRALAX) 17 GM/DOSE POWDER    Take 17 g (1 Capful) by mouth daily.     Scribe Disclosure:  I, Lore Yady, am serving as a scribe at 11:50 AM on 2/7/2025 to document services personally performed by Ebenezer Nieto MD based on my observations and the provider's statements to me.        Ebenezer Nieto MD  02/07/25 [id number]

## 2025-02-07 NOTE — CONSULTS
Colon and Rectal Surgery Consultation Note  [facility]    Anitha Gauthier MRN# [id number]   Age: 89 year old YOB: 1935     Date of Admission:  2/7/2025    Reason for consult: Possible large bowel obstruction       Requesting physician: ED provider        Level of consult: Consult, follow and place orders           Assessment:   89 year old female presented to ED today with abdominal pain that would radiate up to her chest. CBC normal. CT showed large stool burden. Patient reports her last bowel movement was Wednesday evening. Denies any nausea, Vomiting. She does report she is passing flatus today. Does not have any bowel regimen currently and reports she oscillates between diarrhea and constipation and has always done this.      CT 2/7/25:  IMPRESSION:   1.  Large amount of stool distending the ascending and transverse colon with transition to decompressed colon in the left lower quadrant. Adjacent fat stranding. Appearance suspicious for large bowel obstruction. Possible tethering of the transition   point without definitive twisting/volvulus. Consider surgical consultation.     2.  No bowel pneumatosis, free air, or focal fluid collection.     3.  Lower lung reticular and nodular opacities likely combination of chronic fibrosis and small airway infection/inflammation.         Recommendations:   - Patient would benefit from and enema and miralax to evacuate stool it is reasonable to do this at home or here at the hospital. Will defer to patient and ED provider what she plans. If being admitted, admit to hospitalist   - no emergent surgical intervention indicated           History of Present Illness:   CC: possible large bowel obstruction     Patient reports she came in today as she was having abdominal pain that radiated up to her chest that was accompanied by elevated blood pressure. Denies ever having hypertension and does not take any medication for that. Patient states she  "has had this intermittent abdominal pain for over a year. She describes it as muscular. Currently has no pain. Has little appetite but denies nausea or vomiting. Is passing gas today. Last bowel movement per patient was Wednesday evening.     Surgical history: cholecystectomy   Colonoscopy: 2009 and was clear. Per patient she has never had a polyp         Past Medical History:     Past Medical History:   Diagnosis Date    Depressive disorder, not elsewhere classified     anxiety/depression    Diffuse cystic mastopathy     fibrocystic breast disease    Elevated blood pressure reading without diagnosis of hypertension     \"White Coat HTN\"    Osteoporosis, unspecified     Other and unspecified malignant neoplasm of skin of lip 08/2000    Squamous cell carcinoma             Past Surgical History:     Past Surgical History:   Procedure Laterality Date    COLONOSCOPY  06/10/09    HC REMOVAL GALLBLADDER  1983    Cholecystectomy    [facility]HC COLONOSCOPY W/WO BRUSH/WASH  1/25/2005              Social History:     Social History     Socioeconomic History    Marital status:      Spouse name: Not on file    Number of children: 3    Years of education: 14    Highest education level: Not on file   Occupational History    Not on file   Tobacco Use    Smoking status: Former    Smokeless tobacco: Never    Tobacco comments:     Quite 1978   Substance and Sexual Activity    Alcohol use: Yes     Alcohol/week: 3.0 standard drinks of alcohol     Types: 3 Glasses of wine per week     Comment: 1 glass wine 3 days per week    Drug use: No    Sexual activity: Yes     Partners: Male     Comment: Post menopausal   Other Topics Concern     Service No    Blood Transfusions No    Caffeine Concern Yes     Comment: coffee/tea: 1c/d      Occupational Exposure No    Hobby Hazards No    Sleep Concern No    Stress Concern No    Weight Concern No    Special Diet No    Back Care No    Exercise No    Bike Helmet Not Asked    Seat Belt Yes    " Self-Exams Yes   Social History Narrative    Not on file     Social Drivers of Health     Financial Resource Strain: Low Risk  (12/8/2024)    [facility]    Financial Resource Strain     Difficulty of Paying Living Expenses: 3     Difficulty of Paying Living Expenses: Not on file   Food Insecurity: No Food Insecurity (12/8/2024)    [facility]    Food Insecurity     Do you worry your food will run out before you are able to buy more?: 1   Transportation Needs: No Transportation Needs (12/8/2024)    [facility] from [facility]    Transportation Needs     Does lack of transportation keep you from medical appointments?: 1     Does lack of transportation keep you from work, meetings or getting things that you need?: 1   Physical Activity: Not on file   Stress: Not on file   Social Connections: Socially Integrated (12/8/2024)    Received from [facility]    Social Connections     Do you often feel lonely or isolated from those around you?: 0   Interpersonal Safety: Not on file   Housing Stability: Low Risk  (12/8/2024)    [facility]    Housing Stability     What is your housing situation today?: 1             Family History:     Family History   Problem Relation Age of Onset    Allergies Father         HAYFEVER    Cancer Mother         COLON & BRAIN TUMOR    Cancer Maternal Grandmother         STOMACH    Cancer Paternal Grandmother         BREAST    Osteoporosis Mother     Respiratory Son         TWO DX: ASTHMA    Respiratory Father         EMPHYSEMA AND HAYFEVER                 Allergies:      Allergies   Allergen Reactions    Morphine And Codeine Nausea and Vomiting    Sulfa Antibiotics Hives             Medications:     No current facility-administered medications for this encounter.     Current Outpatient  Medications   Medication Sig Dispense Refill    albuterol (PROAIR HFA/PROVENTIL HFA/VENTOLIN HFA) 108 (90 Base) MCG/ACT inhaler Inhale 1-2 puffs into the lungs every 4 hours as needed for cough      cholestyramine (QUESTRAN) 4 GM/DOSE powder Take 1/2 teaspoon daily PRN      clonazePAM (KLONOPIN) 0.5 MG tablet Take 0.25 mg by mouth 2 times daily as needed for anxiety      fluticasone-salmeterol (ADVAIR) 250-50 MCG/ACT inhaler Inhale 1 puff into the lungs 2 times daily.      ipratropium - albuterol 0.5 mg/2.5 mg/3 mL (DUONEB) 0.5-2.5 (3) MG/3ML neb solution Take 1 vial (3 mLs) by nebulization every 6 hours as needed for shortness of breath, wheezing or cough 90 mL 1    multivitamin w/minerals (THERA-VIT-M) tablet Take 1 tablet by mouth daily      omeprazole (PRILOSEC) 20 MG DR capsule Take 20 mg by mouth daily.      tiotropium (SPIRIVA RESPIMAT) 2.5 MCG/ACT inhaler Inhale 2 puffs into the lungs daily      FOSAMAX 70 MG OR TABS ONE TABLET WEEKLY 3 MONTHS 3             Review of Systems:      All other review of systems negative, except for what is mentioned above        Physical Exam:   BP (!) 142/97   Pulse 96   Temp 98  F (36.7  C)   Resp 16   SpO2 96%   General: Alert, interactive, NAD  Resp: nonlabored breathing on RA  Abdomen: Soft, nontender, nondistended. no rebound or guarding.  Extremities: No LE edema or obvious joint abnormalities  Skin: Warm and dry            Data:   All laboratory data reviewed  All imaging reviewed with Dr. Jolynn Edwards PA-C ..................2/7/2025   4:42 PM  Colon and Rectal Surgery  Securely message with [employer]  Text page via [employer] Paging/Directory  Pager: [phone], on service M-F 7a-5p     =================================  Primary Service: COLORECTAL SURGERY [facility]  For First Call information, please search [facility]'s directory under Colon & Rectal Surgery / [facility]  =================================      The above plan of care was performed  and communicated to me by CRS Staff: Dr. Joseph Neil     Medical Decision Making       60 MINUTES SPENT BY ME on the date of service doing chart review, history, exam, documentation & further activities per the note.

## 2025-02-07 NOTE — ED TRIAGE NOTES
Pt reports recent PNA, CT on Wednesday, then starting W night, started having pain in her stomach that would radiate up to her chest, called 911. EMS did EKG, that pain went away, now again this morning she is having abdominal pain from lower abdomen up to chest. Pt noted some chills yesterday. No recent appetite.

## 2025-02-07 NOTE — PHARMACY-ADMISSION MEDICATION HISTORY
Pharmacist Admission Medication History    Admission medication history is complete. The information provided in this note is only as accurate as the sources available at the time of the update.    Information Source(s): Patient and CareEverywhere/SureScripts via in-person    Pertinent Information: None    Changes made to PTA medication list:  Added: Wixela, omeprazole  Deleted: acyclovir, azithromycin, calcium/vitaminD, paroxetine (pt weaned off), prednisone  Changed: None    Allergies reviewed with patient and updates made in EHR: yes    Medication History Completed By: Jaqui Bledsoe RPH 2/7/2025 4:32 PM    PTA Med List   Medication Sig Last Dose/Taking    albuterol (PROAIR HFA/PROVENTIL HFA/VENTOLIN HFA) 108 (90 Base) MCG/ACT inhaler Inhale 1-2 puffs into the lungs every 4 hours as needed for cough Taking As Needed    cholestyramine (QUESTRAN) 4 GM/DOSE powder Take 1/2 teaspoon daily PRN Taking    clonazePAM (KLONOPIN) 0.5 MG tablet Take 0.25 mg by mouth 2 times daily as needed for anxiety 2/7/2025    fluticasone-salmeterol (ADVAIR) 250-50 MCG/ACT inhaler Inhale 1 puff into the lungs 2 times daily. 2/7/2025 Morning    ipratropium - albuterol 0.5 mg/2.5 mg/3 mL (DUONEB) 0.5-2.5 (3) MG/3ML neb solution Take 1 vial (3 mLs) by nebulization every 6 hours as needed for shortness of breath, wheezing or cough Taking As Needed    multivitamin w/minerals (THERA-VIT-M) tablet Take 1 tablet by mouth daily Past Week    omeprazole (PRILOSEC) 20 MG DR capsule Take 20 mg by mouth daily. 2/7/2025 Morning    tiotropium (SPIRIVA RESPIMAT) 2.5 MCG/ACT inhaler Inhale 2 puffs into the lungs daily 2/7/2025 Morning

## 2025-02-08 ENCOUNTER — TELEPHONE (OUTPATIENT)
Dept: SURGERY | Facility: CLINIC | Age: 89
End: 2025-02-08

## 2025-02-12 ENCOUNTER — TRANSCRIBE ORDERS (OUTPATIENT)
Dept: OTHER | Age: 89
End: 2025-02-12

## 2025-02-12 DIAGNOSIS — J44.9 CHRONIC OBSTRUCTIVE PULMONARY DISEASE, UNSPECIFIED COPD TYPE (H): ICD-10-CM

## 2025-02-12 DIAGNOSIS — J18.9 PNEUMONIA OF RIGHT MIDDLE LOBE DUE TO INFECTIOUS ORGANISM: Primary | ICD-10-CM

## 2025-02-12 DIAGNOSIS — J90 PLEURAL EFFUSION ON RIGHT: ICD-10-CM

## 2025-02-16 ENCOUNTER — APPOINTMENT (OUTPATIENT)
Dept: GENERAL RADIOLOGY | Facility: CLINIC | Age: 89
End: 2025-02-16
Attending: EMERGENCY MEDICINE
Payer: MEDICARE

## 2025-02-16 ENCOUNTER — HOSPITAL ENCOUNTER (EMERGENCY)
Facility: CLINIC | Age: 89
Discharge: HOME | End: 2025-02-16
Attending: EMERGENCY MEDICINE | Admitting: EMERGENCY MEDICINE
Payer: MEDICARE

## 2025-02-16 ENCOUNTER — APPOINTMENT (OUTPATIENT)
Dept: ULTRASOUND IMAGING | Facility: CLINIC | Age: 89
End: 2025-02-16
Attending: EMERGENCY MEDICINE
Payer: MEDICARE

## 2025-02-16 VITALS
SYSTOLIC BLOOD PRESSURE: 173 MMHG | TEMPERATURE: 97.7 F | HEIGHT: 60 IN | HEART RATE: 83 BPM | DIASTOLIC BLOOD PRESSURE: 99 MMHG | WEIGHT: 100 LBS | BODY MASS INDEX: 19.63 KG/M2

## 2025-02-16 DIAGNOSIS — K59.00 CONSTIPATION, UNSPECIFIED CONSTIPATION TYPE: ICD-10-CM

## 2025-02-16 DIAGNOSIS — F41.9 ANXIETY: ICD-10-CM

## 2025-02-16 DIAGNOSIS — T50.905A MEDICATION REACTION, INITIAL ENCOUNTER: ICD-10-CM

## 2025-02-16 DIAGNOSIS — G47.09 OTHER INSOMNIA: ICD-10-CM

## 2025-02-16 DIAGNOSIS — R10.9 ABDOMINAL PAIN, UNSPECIFIED ABDOMINAL LOCATION: ICD-10-CM

## 2025-02-16 LAB
ALBUMIN SERPL BCG-MCNC: 4.4 G/DL (ref 3.5–5.2)
ALP SERPL-CCNC: 125 U/L (ref 40–150)
ALT SERPL W P-5'-P-CCNC: 31 U/L (ref 0–50)
ANION GAP SERPL CALCULATED.3IONS-SCNC: 12 MMOL/L (ref 7–15)
AST SERPL W P-5'-P-CCNC: 23 U/L (ref 0–45)
BASOPHILS # BLD AUTO: 0.1 10E3/UL (ref 0–0.2)
BASOPHILS NFR BLD AUTO: 1 %
BILIRUB SERPL-MCNC: 0.5 MG/DL
BUN SERPL-MCNC: 5.4 MG/DL (ref 8–23)
CALCIUM SERPL-MCNC: 9.6 MG/DL (ref 8.8–10.4)
CHLORIDE SERPL-SCNC: 96 MMOL/L (ref 98–107)
CREAT SERPL-MCNC: 0.65 MG/DL (ref 0.51–0.95)
EGFRCR SERPLBLD CKD-EPI 2021: 84 ML/MIN/1.73M2
EOSINOPHIL # BLD AUTO: 0 10E3/UL (ref 0–0.7)
EOSINOPHIL NFR BLD AUTO: 0 %
ERYTHROCYTE [DISTWIDTH] IN BLOOD BY AUTOMATED COUNT: 13.9 % (ref 10–15)
GLUCOSE SERPL-MCNC: 102 MG/DL (ref 70–99)
HCO3 SERPL-SCNC: 29 MMOL/L (ref 22–29)
HCT VFR BLD AUTO: 39.4 % (ref 35–47)
HGB BLD-MCNC: 13.2 G/DL (ref 11.7–15.7)
IMM GRANULOCYTES # BLD: 0 10E3/UL
IMM GRANULOCYTES NFR BLD: 0 %
LIPASE SERPL-CCNC: 22 U/L (ref 13–60)
LYMPHOCYTES # BLD AUTO: 2 10E3/UL (ref 0.8–5.3)
LYMPHOCYTES NFR BLD AUTO: 27 %
MCH RBC QN AUTO: 31.2 PG (ref 26.5–33)
MCHC RBC AUTO-ENTMCNC: 33.5 G/DL (ref 31.5–36.5)
MCV RBC AUTO: 93 FL (ref 78–100)
MONOCYTES # BLD AUTO: 0.7 10E3/UL (ref 0–1.3)
MONOCYTES NFR BLD AUTO: 10 %
NEUTROPHILS # BLD AUTO: 4.5 10E3/UL (ref 1.6–8.3)
NEUTROPHILS NFR BLD AUTO: 61 %
NRBC # BLD AUTO: 0 10E3/UL
NRBC BLD AUTO-RTO: 0 /100
PLATELET # BLD AUTO: 403 10E3/UL (ref 150–450)
POTASSIUM SERPL-SCNC: 3.5 MMOL/L (ref 3.4–5.3)
PROT SERPL-MCNC: 7.2 G/DL (ref 6.4–8.3)
RBC # BLD AUTO: 4.23 10E6/UL (ref 3.8–5.2)
SODIUM SERPL-SCNC: 137 MMOL/L (ref 135–145)
WBC # BLD AUTO: 7.3 10E3/UL (ref 4–11)

## 2025-02-16 PROCEDURE — 83690 ASSAY OF LIPASE: CPT | Performed by: EMERGENCY MEDICINE

## 2025-02-16 PROCEDURE — 76705 ECHO EXAM OF ABDOMEN: CPT

## 2025-02-16 PROCEDURE — 36415 COLL VENOUS BLD VENIPUNCTURE: CPT | Performed by: EMERGENCY MEDICINE

## 2025-02-16 PROCEDURE — 85025 COMPLETE CBC W/AUTO DIFF WBC: CPT | Performed by: EMERGENCY MEDICINE

## 2025-02-16 PROCEDURE — 74019 RADEX ABDOMEN 2 VIEWS: CPT

## 2025-02-16 PROCEDURE — 99284 EMERGENCY DEPT VISIT MOD MDM: CPT | Mod: 25

## 2025-02-16 PROCEDURE — 82435 ASSAY OF BLOOD CHLORIDE: CPT | Performed by: EMERGENCY MEDICINE

## 2025-02-16 RX ORDER — ONDANSETRON 4 MG/1
4 TABLET, FILM COATED ORAL EVERY 8 HOURS
Qty: 15 TABLET | Refills: 0 | Status: SHIPPED | OUTPATIENT
Start: 2025-02-16 | End: 2025-02-21

## 2025-02-16 RX ORDER — TRAZODONE HYDROCHLORIDE 50 MG/1
50 TABLET ORAL AT BEDTIME
Qty: 30 TABLET | Refills: 0 | Status: SHIPPED | OUTPATIENT
Start: 2025-02-16 | End: 2025-03-18

## 2025-02-16 ASSESSMENT — ACTIVITIES OF DAILY LIVING (ADL)
ADLS_ACUITY_SCORE: 47

## 2025-02-16 NOTE — ED PROVIDER NOTES
Emergency Department Note      History of Present Illness     Chief Complaint   Medication Reaction      HPI   Anitha Gauthier is a 89 year old female with a history of COPD and anxiety, presenting to the emergency department for evaluation of a medication reaction. The patient reports that she was seen here in the emergency department 9 days ago for constipation. She was discharged on Colace, Dulcolax, and Miralax. Following this visit, she saw that her alkaline phosphatase enzyme was elevated, and grew concerned after reading online some possible causes of this. She also reports that she has had a decreased appetite over the past few months. She saw a Geriatric provider four days ago who put her on Buspar and gave her Zofran. She notes that the Buspar made her feel flushed. She indicates a history of a cholecystectomy.       Independent Historian   None    Review of External Notes   I reviewed a note from the patient's visit to the emergency departmetn on 2/7/25, where the patient was seen by Dr. Nieto. The patient has a history of COPD and PMR, and she was evaluated for chest pain and abdominal pain. The patient had a CT scan of her chest on 2/5/25 which showed some signs of a lingering infection. On 2/7/25, she reports her abdominal pain radiated to her chest. During this visit, the patient had a CMP which showed an ALT of 76, and alkaline phosphatase of 253, a normal lipase, normal viral markers, and a normal CBC. Her EKG was normal. The patient's CT scan of her abdomen showed significant constipation. Colorectal surgery saw the patient and did not feel as if she had a bowel obstruction, but just constipation. She was discharged on Colace, Dulcolax, and Miralax.      Past Medical History     Medical History and Problem List   Depression  Cystic mastopathy  Malignant neoplasm of skin of lip  Anxiety   COPD  PMR  Stricture and stenosis of esophagus     Medications   Albuterol   Dulcolax  Questran    Klonopin  Colace  Advair  Prilosec  Miralax  Tiotropium     Surgical History   Colonoscopy  Cholecystectomy      Physical Exam     Patient Vitals for the past 24 hrs:   BP Temp Temp src Pulse Height Weight   02/16/25 0726 (!) 173/99 97.7  F (36.5  C) Oral 83 1.524 m (5') 45.4 kg (100 lb)     Physical Exam  General: The patient is significantly anxious.   Head:  The scalp, face, and head appear normal  Eyes:  The pupils are equal, round, and reactive to light    There is no nystagmus    Extraocular muscles are intact    Conjunctivae and sclerae are normal  ENT:    The nose is normal    Pinnae are normal    The oropharynx is normal  Neck:  Normal range of motion    There is no rigidity noted  CV:  Regular rate  Normal underlying rhythm     Normal S1/S2    No pathological murmur detected  Resp:  Lungs are clear    There is no tachypnea    Non-labored    No rales    No wheezing   GI:  Abdomen is soft, there is no rigidity    No tympani    No rebound tenderness     Non-surgical without peritoneal features at this time    There is no focal area of abdominal pain.     There is mild bloating/distension.  MS:  Normal muscular tone    Symmetric motor strength    No major joint effusions    No asymmetric leg swelling    No calf tenderness  Skin:  No rash or acute skin lesions noted  Neuro:  Speech is normal and fluent, there is no aphasia    No motor deficits    Cranial nerves are intact  Psych: Awake. Alert.      Normal affect  Appropriate interactions          Diagnostics     Lab Results   Labs Ordered and Resulted from Time of ED Arrival to Time of ED Departure   COMPREHENSIVE METABOLIC PANEL - Abnormal       Result Value    Sodium 137      Potassium 3.5      Carbon Dioxide (CO2) 29      Anion Gap 12      Urea Nitrogen 5.4 (*)     Creatinine 0.65      GFR Estimate 84      Calcium 9.6      Chloride 96 (*)     Glucose 102 (*)     Alkaline Phosphatase 125      AST 23      ALT 31      Protein Total 7.2      Albumin 4.4       Bilirubin Total 0.5     LIPASE - Normal    Lipase 22     CBC WITH PLATELETS AND DIFFERENTIAL    WBC Count 7.3      RBC Count 4.23      Hemoglobin 13.2      Hematocrit 39.4      MCV 93      MCH 31.2      MCHC 33.5      RDW 13.9      Platelet Count 403      % Neutrophils 61      % Lymphocytes 27      % Monocytes 10      % Eosinophils 0      % Basophils 1      % Immature Granulocytes 0      NRBCs per 100 WBC 0      Absolute Neutrophils 4.5      Absolute Lymphocytes 2.0      Absolute Monocytes 0.7      Absolute Eosinophils 0.0      Absolute Basophils 0.1      Absolute Immature Granulocytes 0.0      Absolute NRBCs 0.0         Imaging   US Abdomen Limited   Final Result   IMPRESSION:   1.  Cholecystectomy.   2.  Otherwise, normal right upper quadrant ultrasound. No biliary dilatation.            Abdomen XR, 2 vw, flat and upright   Final Result   IMPRESSION: Nonobstructive bowel gas pattern with no free air, gross organomegaly nor urinary tract calculus. Overall decrease in stool burden with mild residual stool burden in the ascending colon.          EKG   None     Independent Interpretation   I independently interpreted the patient's abdominal X-Ray, noting persistent constipation. No obstruction or free air is appreciated.     ED Course      Medications Administered   Medications - No data to display    Procedures   Procedures     Discussion of Management   None    ED Course   ED Course as of 02/16/25 1310   Sun Feb 16, 2025   1103 I obtained history and examined the patient as noted above.    1310 I reevaluated the patient, and let her know I feel she is safe to be discharged home.        Additional Documentation  None    Medical Decision Making / Diagnosis     CMS Diagnoses: None    MIPS       None    [facility]   Anitha Gauthier is a 89 year old female presents to the emergency department with ongoing abdominal bloating.  She is very anxious and worried about her abnormal alkaline phosphatase that she had done recently.   Please see the results above, the patient has had recent CT scan of the chest and abdomen.  She is status postcholecystectomy.  She does have a high anxiety.  She notes that she has felt nauseous and has not had much of an appetite.  Ultrasound of the abdomen is negative.  All of her blood work today is normal.  The patient will be given some Zofran to see if this will be helpful with her nausea she can continue MiraLAX and other over-the-counter agents as she still is mildly constipated, there is no acute liver abnormality or biliary abnormality.  The patient's buspirone is causing some untoward side effects, we will discontinue that and try her on a course of trazodone 50 mg at night to see if that will be more helpful.  She can follow-up with primary care for further adjustments of nighttime medications for anxiety and insomnia.    Disposition   The patient was discharged.     Diagnosis     ICD-10-CM    1. Abdominal pain, unspecified abdominal location  R10.9       2. Anxiety  F41.9       3. Other insomnia  G47.09       4. Medication reaction, initial encounter  T50.905A       5. Constipation, unspecified constipation type  K59.00            Discharge Medications   New Prescriptions    TRAZODONE (DESYREL) 50 MG TABLET    Take 1 tablet (50 mg) by mouth at bedtime.         Scribe Disclosure:  I, Nadir Hunter, am serving as a scribe at 11:39 AM on 2/16/2025 to document services personally performed by Stoney Pisano MD based on my observations and the provider's statements to me.        Stoney Pisano MD  02/16/25 [id number]

## 2025-02-16 NOTE — DISCHARGE INSTRUCTIONS
Hold the buspirone at this time  You can continue using MiraLAX or other over-the-counter medications to assist with your constipation  You may wish to try some trazodone at nighttime which can help with sleep and anxiety.

## 2025-02-16 NOTE — ED TRIAGE NOTES
pt comes in today after starting buspar and has a sore throat and is unable to sleep for the last 2 days after starting the medication

## 2025-06-18 ENCOUNTER — HOSPITAL ENCOUNTER (EMERGENCY)
Facility: CLINIC | Age: 89
Discharge: HOME | End: 2025-06-18
Attending: EMERGENCY MEDICINE | Admitting: EMERGENCY MEDICINE
Payer: MEDICARE

## 2025-06-18 VITALS
HEIGHT: 60 IN | RESPIRATION RATE: 20 BRPM | DIASTOLIC BLOOD PRESSURE: 73 MMHG | BODY MASS INDEX: 19.63 KG/M2 | TEMPERATURE: 98.3 F | WEIGHT: 100 LBS | HEART RATE: 86 BPM | SYSTOLIC BLOOD PRESSURE: 120 MMHG | OXYGEN SATURATION: 94 %

## 2025-06-18 DIAGNOSIS — R06.02 SOB (SHORTNESS OF BREATH): ICD-10-CM

## 2025-06-18 DIAGNOSIS — R07.9 CHEST PAIN, UNSPECIFIED TYPE: ICD-10-CM

## 2025-06-18 LAB
ANION GAP SERPL CALCULATED.3IONS-SCNC: 13 MMOL/L (ref 7–15)
ATRIAL RATE - MUSE: 86 BPM
BASOPHILS # BLD AUTO: 0 10E3/UL (ref 0–0.2)
BASOPHILS NFR BLD AUTO: 0 %
BUN SERPL-MCNC: 13.6 MG/DL (ref 8–23)
CALCIUM SERPL-MCNC: 9.2 MG/DL (ref 8.8–10.4)
CHLORIDE SERPL-SCNC: 98 MMOL/L (ref 98–107)
CREAT SERPL-MCNC: 0.7 MG/DL (ref 0.51–0.95)
DIASTOLIC BLOOD PRESSURE - MUSE: NORMAL MMHG
EGFRCR SERPLBLD CKD-EPI 2021: 82 ML/MIN/1.73M2
EOSINOPHIL # BLD AUTO: 0 10E3/UL (ref 0–0.7)
EOSINOPHIL NFR BLD AUTO: 0 %
ERYTHROCYTE [DISTWIDTH] IN BLOOD BY AUTOMATED COUNT: 14 % (ref 10–15)
GLUCOSE SERPL-MCNC: 107 MG/DL (ref 70–99)
HCO3 SERPL-SCNC: 24 MMOL/L (ref 22–29)
HCT VFR BLD AUTO: 35.6 % (ref 35–47)
HGB BLD-MCNC: 11.7 G/DL (ref 11.7–15.7)
HOLD SPECIMEN: NORMAL
IMM GRANULOCYTES # BLD: 0.1 10E3/UL
IMM GRANULOCYTES NFR BLD: 0 %
INTERPRETATION ECG - MUSE: NORMAL
LYMPHOCYTES # BLD AUTO: 1.2 10E3/UL (ref 0.8–5.3)
LYMPHOCYTES NFR BLD AUTO: 8 %
MCH RBC QN AUTO: 31.3 PG (ref 26.5–33)
MCHC RBC AUTO-ENTMCNC: 32.9 G/DL (ref 31.5–36.5)
MCV RBC AUTO: 95 FL (ref 78–100)
MONOCYTES # BLD AUTO: 1.4 10E3/UL (ref 0–1.3)
MONOCYTES NFR BLD AUTO: 9 %
NEUTROPHILS # BLD AUTO: 12.2 10E3/UL (ref 1.6–8.3)
NEUTROPHILS NFR BLD AUTO: 82 %
NRBC # BLD AUTO: 0 10E3/UL
NRBC BLD AUTO-RTO: 0 /100
P AXIS - MUSE: 78 DEGREES
PLATELET # BLD AUTO: 299 10E3/UL (ref 150–450)
POTASSIUM SERPL-SCNC: 3.7 MMOL/L (ref 3.4–5.3)
PR INTERVAL - MUSE: 126 MS
QRS DURATION - MUSE: 78 MS
QT - MUSE: 322 MS
QTC - MUSE: 385 MS
R AXIS - MUSE: 90 DEGREES
RBC # BLD AUTO: 3.74 10E6/UL (ref 3.8–5.2)
SODIUM SERPL-SCNC: 135 MMOL/L (ref 135–145)
SYSTOLIC BLOOD PRESSURE - MUSE: NORMAL MMHG
T AXIS - MUSE: 20 DEGREES
TROPONIN T SERPL HS-MCNC: 11 NG/L
VENTRICULAR RATE- MUSE: 86 BPM
WBC # BLD AUTO: 14.8 10E3/UL (ref 4–11)

## 2025-06-18 PROCEDURE — 85025 COMPLETE CBC W/AUTO DIFF WBC: CPT | Performed by: EMERGENCY MEDICINE

## 2025-06-18 PROCEDURE — 250N000009 HC RX 250: Performed by: EMERGENCY MEDICINE

## 2025-06-18 PROCEDURE — 96374 THER/PROPH/DIAG INJ IV PUSH: CPT

## 2025-06-18 PROCEDURE — 36415 COLL VENOUS BLD VENIPUNCTURE: CPT | Performed by: EMERGENCY MEDICINE

## 2025-06-18 PROCEDURE — 250N000013 HC RX MED GY IP 250 OP 250 PS 637: Performed by: EMERGENCY MEDICINE

## 2025-06-18 PROCEDURE — 94640 AIRWAY INHALATION TREATMENT: CPT

## 2025-06-18 PROCEDURE — 84484 ASSAY OF TROPONIN QUANT: CPT | Performed by: EMERGENCY MEDICINE

## 2025-06-18 PROCEDURE — 99284 EMERGENCY DEPT VISIT MOD MDM: CPT | Mod: 25

## 2025-06-18 PROCEDURE — 80048 BASIC METABOLIC PNL TOTAL CA: CPT | Performed by: EMERGENCY MEDICINE

## 2025-06-18 PROCEDURE — 250N000011 HC RX IP 250 OP 636: Performed by: EMERGENCY MEDICINE

## 2025-06-18 PROCEDURE — 93005 ELECTROCARDIOGRAM TRACING: CPT

## 2025-06-18 RX ORDER — KETOROLAC TROMETHAMINE 15 MG/ML
15 INJECTION, SOLUTION INTRAMUSCULAR; INTRAVENOUS ONCE
Status: COMPLETED | OUTPATIENT
Start: 2025-06-18 | End: 2025-06-18

## 2025-06-18 RX ORDER — ACETAMINOPHEN 500 MG
500 TABLET ORAL ONCE
Status: COMPLETED | OUTPATIENT
Start: 2025-06-18 | End: 2025-06-18

## 2025-06-18 RX ORDER — IPRATROPIUM BROMIDE AND ALBUTEROL SULFATE 2.5; .5 MG/3ML; MG/3ML
3 SOLUTION RESPIRATORY (INHALATION) ONCE
Status: COMPLETED | OUTPATIENT
Start: 2025-06-18 | End: 2025-06-18

## 2025-06-18 RX ADMIN — IPRATROPIUM BROMIDE AND ALBUTEROL SULFATE 3 ML: .5; 3 SOLUTION RESPIRATORY (INHALATION) at 09:08

## 2025-06-18 RX ADMIN — KETOROLAC TROMETHAMINE 15 MG: 15 INJECTION, SOLUTION INTRAMUSCULAR; INTRAVENOUS at 10:58

## 2025-06-18 RX ADMIN — ACETAMINOPHEN 500 MG: 500 TABLET, FILM COATED ORAL at 10:58

## 2025-06-18 ASSESSMENT — COLUMBIA-SUICIDE SEVERITY RATING SCALE - C-SSRS
6. HAVE YOU EVER DONE ANYTHING, STARTED TO DO ANYTHING, OR PREPARED TO DO ANYTHING TO END YOUR LIFE?: NO
1. IN THE PAST MONTH, HAVE YOU WISHED YOU WERE DEAD OR WISHED YOU COULD GO TO SLEEP AND NOT WAKE UP?: NO
2. HAVE YOU ACTUALLY HAD ANY THOUGHTS OF KILLING YOURSELF IN THE PAST MONTH?: NO

## 2025-06-18 ASSESSMENT — ACTIVITIES OF DAILY LIVING (ADL)
ADLS_ACUITY_SCORE: 47

## 2025-06-18 NOTE — DISCHARGE INSTRUCTIONS
We think that you might need a couple more days for the antibiotics and steroids to take full effect.  For your pain we recommend that you continue taking Tylenol.  You can also try taking ibuprofen as needed.  We are giving you a dose of medication in the emergency department, so your next dose you can take of either of these medications would be 5 PM.  We recommend you follow-up with your primary care provider.  Please return to the emergency department if you develop any new or concerning symptoms.

## 2025-06-18 NOTE — ED TRIAGE NOTES
SOB that started on Saturday. Was seen at  on Monday. Has done nebs x2 yesterday. Intermittent chest pain since Saturday left chest. Was diagnosed with pna at . Hx of COPD.     Triage Assessment (Adult)       Row Name 06/18/25 0854          Triage Assessment    Airway WDL WDL        Respiratory WDL    Respiratory WDL X;rhythm/pattern     Rhythm/Pattern, Respiratory tachypneic;shortness of breath        Cardiac WDL    Cardiac WDL X;chest pain

## 2025-06-18 NOTE — ED PROVIDER NOTES
Emergency Department Note      History of Present Illness     Chief Complaint   Shortness of Breath      HPI   Anitha Gauthier is a 89 year old female presenting with chest pain and shortness of breath.  She presented to urgent care 2 days ago for similar symptoms.  She has a history of COPD.  She was prescribed antibiotics and prednisone after a chest x-ray was concerning for pneumonia.  She endorses using her home inhaler, but feels that is not helping.  She has diffuse chest wall pain.  She tried Tylenol last night which helped, but it has not seemed to help today.  She endorses a productive cough.  She does not typically require supplemental oxygen.  No fever, chills, nausea, vomiting, abdominal pain.    Independent Historian   None    Review of External Notes   6/16/25:  note reviewed    Past Medical History     Medical History and Problem List   COPD  Osteoporosis  Laryngospasm  TMJ syndrome  Polymyalgia rheumatica  Diffuse cystic mastopathy  Dysthymic disorder    Medications   Clonazepam  Paxil   Duoneb  Prilosec  Desyrel   Advair   Spiriva Respimat    Surgical History   Colonoscopy  Cholecystectomy  Laser eye procedure  Cataract removal    Physical Exam     Patient Vitals for the past 24 hrs:   BP Temp Pulse Resp SpO2 Height Weight   06/18/25 1030 120/73 -- 86 20 94 % -- --   06/18/25 0856 -- -- -- -- -- 1.524 m (5') 45.4 kg (100 lb)   06/18/25 0853 -- 98.3  F (36.8  C) -- -- -- -- --   06/18/25 0852 (!) 160/83 -- 87 22 93 % -- --     Physical Exam  General: No acute distress  Head: No obvious trauma to head.  Ears, Nose, Throat:  External ears normal.  Nose normal.  No pharyngeal erythema, swelling or exudate.  Midline uvula. Moist mucus membranes.  Eyes:  Conjunctivae clear.   Neck: Normal range of motion.  Neck supple.   CV: Regular rate and rhythm.  No murmurs.      Respiratory: Effort normal and breath sounds normal. Faint late expiratory wheezing in the bilateral lung bases  Gastrointestinal:  Soft.  No distension. There is no tenderness.  There is no rigidity, no rebound and no guarding.   Musculoskeletal: Normal range of motion.  Non tender extremities to palpations. No lower extremity edema  Neuro: Alert. Moving all extremities appropriately.  Normal speech.    Skin: Skin is warm and dry.  No rash noted.   Psych: Normal mood and affect. Behavior is normal.       Diagnostics     Lab Results   Labs Ordered and Resulted from Time of ED Arrival to Time of ED Departure   BASIC METABOLIC PANEL - Abnormal       Result Value    Sodium 135      Potassium 3.7      Chloride 98      Carbon Dioxide (CO2) 24      Anion Gap 13      Urea Nitrogen 13.6      Creatinine 0.70      GFR Estimate 82      Calcium 9.2      Glucose 107 (*)    CBC WITH PLATELETS AND DIFFERENTIAL - Abnormal    WBC Count 14.8 (*)     RBC Count 3.74 (*)     Hemoglobin 11.7      Hematocrit 35.6      MCV 95      MCH 31.3      MCHC 32.9      RDW 14.0      Platelet Count 299      % Neutrophils 82      % Lymphocytes 8      % Monocytes 9      % Eosinophils 0      % Basophils 0      % Immature Granulocytes 0      NRBCs per 100 WBC 0      Absolute Neutrophils 12.2 (*)     Absolute Lymphocytes 1.2      Absolute Monocytes 1.4 (*)     Absolute Eosinophils 0.0      Absolute Basophils 0.0      Absolute Immature Granulocytes 0.1      Absolute NRBCs 0.0     TROPONIN T, HIGH SENSITIVITY - Normal    Troponin T, High Sensitivity 11       EKG   ECG taken at 0855, ECG read at 0900  Sinus rhythm with Premature atrial complexes. Rightward axis. Septal infarct, age undetermined. Abnormal ECG.    Significant changes as compared to prior, dated 02/7/2025.  Rate 86 bpm. WI interval 126 ms. QRS duration 78 ms. QT/QTc 322/385 ms. P-R-T axes 78 90 20.    Independent Interpretation   None    ED Course      Medications Administered   Medications   ipratropium - albuterol 0.5 mg/2.5 mg (3mg)/3 mL (DUONEB) neb solution 3 mL (3 mLs Nebulization $Given 6/18/25 0908)   acetaminophen  (TYLENOL) tablet 500 mg (500 mg Oral $Given 6/18/25 1056)   ketorolac (TORADOL) injection 15 mg (15 mg Intravenous $Given 6/18/25 1055)     Discussion of Management   None    ED Course   ED Course as of 06/18/25 1104   Wed Jun 18, 2025   0905 I obtained history and examined the patient as noted above.     1043 I reassessed and updated the patient.         Additional Documentation  None    Medical Decision Making / Diagnosis     CMS Diagnoses: None    MIPS   None    MDM   Anitha Gauthier is a 89 year old female presenting with shortness of breath and chest pain.  She was recently diagnosed with pneumonia and is currently taking antibiotics.  She appears well on exam and is afebrile.  Her chest pain appears to be chest wall tenderness.  She had a chest x-ray completed 2 days ago.  A repeat x-ray is unlikely to provide helpful information at this time.  EKG shows no ischemic changes.  Troponin is not elevated.  Blood work shows that she does have a leukocytosis, likely secondary to the pneumonia.  She is given a DuoNeb treatment.  She is able to ambulate independently and does not desaturate while on room air.  She reports her symptoms seem to improve when she took Tylenol yesterday.  She is given Toradol and Tylenol.  It does not seem that she is failing outpatient therapy at this time.  She is instructed to finish her course of antibiotics and steroids and to follow-up with her primary care provider.  Strict return precautions are given and she verbalizes understanding.  She is discharged home in stable condition.    Disposition   The patient was discharged.     Diagnosis     ICD-10-CM    1. SOB (shortness of breath)  R06.02       2. Chest pain, unspecified type  R07.9          Discharge Medications   New Prescriptions    No medications on file     Scribe Disclosure:  Nieves SERRATO, am serving as a scribe at 10:11 AM on 6/18/2025 to document services personally performed by Stephen Pendleton MD based on my  observations and the provider's statements to me.        Stephen Pendleton MD  06/18/25 6098     Statement Selected